# Patient Record
Sex: FEMALE | Race: WHITE | Employment: UNEMPLOYED | URBAN - METROPOLITAN AREA
[De-identification: names, ages, dates, MRNs, and addresses within clinical notes are randomized per-mention and may not be internally consistent; named-entity substitution may affect disease eponyms.]

---

## 2017-02-23 ENCOUNTER — HOSPITAL ENCOUNTER (EMERGENCY)
Age: 52
Discharge: HOME | End: 2017-02-24
Attending: EMERGENCY MEDICINE
Payer: SELF-PAY

## 2017-02-23 ENCOUNTER — APPOINTMENT (OUTPATIENT)
Dept: CT IMAGING | Age: 52
End: 2017-02-23
Attending: EMERGENCY MEDICINE
Payer: SELF-PAY

## 2017-02-23 DIAGNOSIS — R42 VERTIGO: Primary | ICD-10-CM

## 2017-02-23 LAB
ALBUMIN SERPL BCP-MCNC: 3.9 G/DL (ref 3.5–5)
ALBUMIN/GLOB SERPL: 1 {RATIO} (ref 1.1–2.2)
ALP SERPL-CCNC: 45 U/L (ref 45–117)
ALT SERPL-CCNC: 28 U/L (ref 12–78)
ANION GAP BLD CALC-SCNC: 8 MMOL/L (ref 5–15)
AST SERPL W P-5'-P-CCNC: 18 U/L (ref 15–37)
BASOPHILS # BLD AUTO: 0 K/UL (ref 0–0.1)
BASOPHILS # BLD: 0 % (ref 0–1)
BILIRUB SERPL-MCNC: 0.4 MG/DL (ref 0.2–1)
BUN SERPL-MCNC: 10 MG/DL (ref 6–20)
BUN/CREAT SERPL: 10 (ref 12–20)
CALCIUM SERPL-MCNC: 10.2 MG/DL (ref 8.5–10.1)
CHLORIDE SERPL-SCNC: 108 MMOL/L (ref 97–108)
CK SERPL-CCNC: 69 U/L (ref 26–192)
CO2 SERPL-SCNC: 26 MMOL/L (ref 21–32)
CREAT SERPL-MCNC: 1.02 MG/DL (ref 0.55–1.02)
EOSINOPHIL # BLD: 0.1 K/UL (ref 0–0.4)
EOSINOPHIL NFR BLD: 3 % (ref 0–7)
ERYTHROCYTE [DISTWIDTH] IN BLOOD BY AUTOMATED COUNT: 15.9 % (ref 11.5–14.5)
GLOBULIN SER CALC-MCNC: 3.8 G/DL (ref 2–4)
GLUCOSE SERPL-MCNC: 98 MG/DL (ref 65–100)
HCT VFR BLD AUTO: 33.3 % (ref 35–47)
HGB BLD-MCNC: 10.1 G/DL (ref 11.5–16)
LYMPHOCYTES # BLD AUTO: 30 % (ref 12–49)
LYMPHOCYTES # BLD: 1.5 K/UL (ref 0.8–3.5)
MCH RBC QN AUTO: 22.6 PG (ref 26–34)
MCHC RBC AUTO-ENTMCNC: 30.3 G/DL (ref 30–36.5)
MCV RBC AUTO: 74.7 FL (ref 80–99)
MONOCYTES # BLD: 0.3 K/UL (ref 0–1)
MONOCYTES NFR BLD AUTO: 6 % (ref 5–13)
NEUTS SEG # BLD: 3 K/UL (ref 1.8–8)
NEUTS SEG NFR BLD AUTO: 61 % (ref 32–75)
PLATELET # BLD AUTO: 209 K/UL (ref 150–400)
POTASSIUM SERPL-SCNC: 3.6 MMOL/L (ref 3.5–5.1)
PROT SERPL-MCNC: 7.7 G/DL (ref 6.4–8.2)
RBC # BLD AUTO: 4.46 M/UL (ref 3.8–5.2)
RBC MORPH BLD: ABNORMAL
RBC MORPH BLD: ABNORMAL
SODIUM SERPL-SCNC: 142 MMOL/L (ref 136–145)
TROPONIN I SERPL-MCNC: <0.04 NG/ML
WBC # BLD AUTO: 4.9 K/UL (ref 3.6–11)
WBC MORPH BLD: ABNORMAL

## 2017-02-23 PROCEDURE — 74011000250 HC RX REV CODE- 250: Performed by: EMERGENCY MEDICINE

## 2017-02-23 PROCEDURE — 74011250636 HC RX REV CODE- 250/636: Performed by: EMERGENCY MEDICINE

## 2017-02-23 PROCEDURE — 85025 COMPLETE CBC W/AUTO DIFF WBC: CPT | Performed by: EMERGENCY MEDICINE

## 2017-02-23 PROCEDURE — 36415 COLL VENOUS BLD VENIPUNCTURE: CPT | Performed by: EMERGENCY MEDICINE

## 2017-02-23 PROCEDURE — 96361 HYDRATE IV INFUSION ADD-ON: CPT

## 2017-02-23 PROCEDURE — 93005 ELECTROCARDIOGRAM TRACING: CPT

## 2017-02-23 PROCEDURE — 84484 ASSAY OF TROPONIN QUANT: CPT | Performed by: EMERGENCY MEDICINE

## 2017-02-23 PROCEDURE — 99284 EMERGENCY DEPT VISIT MOD MDM: CPT

## 2017-02-23 PROCEDURE — 96374 THER/PROPH/DIAG INJ IV PUSH: CPT

## 2017-02-23 PROCEDURE — 82550 ASSAY OF CK (CPK): CPT | Performed by: EMERGENCY MEDICINE

## 2017-02-23 PROCEDURE — 80053 COMPREHEN METABOLIC PANEL: CPT | Performed by: EMERGENCY MEDICINE

## 2017-02-23 PROCEDURE — 96375 TX/PRO/DX INJ NEW DRUG ADDON: CPT

## 2017-02-23 PROCEDURE — 70450 CT HEAD/BRAIN W/O DYE: CPT

## 2017-02-23 RX ORDER — PROCHLORPERAZINE MALEATE 10 MG
10 TABLET ORAL
Qty: 12 TAB | Refills: 0 | Status: SHIPPED | OUTPATIENT
Start: 2017-02-23 | End: 2017-03-02

## 2017-02-23 RX ORDER — KETOROLAC TROMETHAMINE 30 MG/ML
15 INJECTION, SOLUTION INTRAMUSCULAR; INTRAVENOUS
Status: COMPLETED | OUTPATIENT
Start: 2017-02-23 | End: 2017-02-23

## 2017-02-23 RX ORDER — DIPHENHYDRAMINE HYDROCHLORIDE 50 MG/ML
25 INJECTION, SOLUTION INTRAMUSCULAR; INTRAVENOUS
Status: DISCONTINUED | OUTPATIENT
Start: 2017-02-23 | End: 2017-02-24 | Stop reason: HOSPADM

## 2017-02-23 RX ADMIN — KETOROLAC TROMETHAMINE 15 MG: 30 INJECTION, SOLUTION INTRAMUSCULAR at 22:27

## 2017-02-23 RX ADMIN — SODIUM CHLORIDE 1000 ML: 900 INJECTION, SOLUTION INTRAVENOUS at 22:28

## 2017-02-23 RX ADMIN — SODIUM CHLORIDE 10 MG: 9 INJECTION INTRAMUSCULAR; INTRAVENOUS; SUBCUTANEOUS at 22:39

## 2017-02-24 VITALS
OXYGEN SATURATION: 100 % | HEIGHT: 68 IN | DIASTOLIC BLOOD PRESSURE: 95 MMHG | WEIGHT: 293 LBS | HEART RATE: 83 BPM | TEMPERATURE: 98.1 F | RESPIRATION RATE: 16 BRPM | SYSTOLIC BLOOD PRESSURE: 176 MMHG | BODY MASS INDEX: 44.41 KG/M2

## 2017-02-24 LAB
ATRIAL RATE: 312 BPM
CALCULATED P AXIS, ECG09: 39 DEGREES
CALCULATED R AXIS, ECG10: -14 DEGREES
CALCULATED T AXIS, ECG11: 24 DEGREES
DIAGNOSIS, 93000: NORMAL
Q-T INTERVAL, ECG07: 358 MS
QRS DURATION, ECG06: 98 MS
QTC CALCULATION (BEZET), ECG08: 470 MS
VENTRICULAR RATE, ECG03: 104 BPM

## 2017-02-24 NOTE — ED PROVIDER NOTES
HPI Comments: Violetta Proctor is a 50 y.o. female with PMHx of migraine, HTN, and thromboembolus, presenting ambulatory to ED c/o constant, worsening dizziness since 2016. Pt reports associated visual disturbance, noting everything looks distorted and her BL eyes \"feel funny\", mild confusion, and intermittent balance disturbance. She notes today's episode also included a mild HA, which resolved, nausea, and left hand tingling. Pt reports symptoms are unchanged with meclizine. She endorses evaluation yesterday by ENT and vertebral basilar migraine diagnosis, no vertigo. Pt reports history of migraines but notes current symptoms are different. She specifically denies vomiting, photophobia, weakness, and abdominal pain. PCP: None  Social Hx: former smoker (quit date: 1997); + EtOH (occasional); - drug use. There are no other complaints, changes, or physical findings at this time. Written by ESTEFANIA Smithibselena, as dictated by Maylin Isidro MD.          The history is provided by the patient. Past Medical History:   Diagnosis Date    GERD (gastroesophageal reflux disease)     Hypertension     Iron deficiency anemia, unspecified     Long term (current) use of anticoagulants     Hx of pulmonary embolism in 5 yrs ago and 2013.     Lymphedema     Migraine     Migraine     Other and unspecified symptoms and signs involving general sensations and perceptions     migraine    Other ill-defined conditions(799.89)     anemia    Other vitamin B12 deficiency anemia     Thromboembolus ([address]     deena PE x2       Past Surgical History:   Procedure Laterality Date    HX GYN      c sections x 3    HX GYN  3/17/14    HYSTEROSCOPY WITH THERMOCHOICE ENDOMETRIAL ABLATION    HX TUBAL LIGATION      with last          Family History:   Problem Relation Age of Onset    Heart Disease Mother     Cancer Father      stomach    Cancer Sister 39     colon cancer with mets to the liver.       Social History     Social History    Marital status:      Spouse name: N/A    Number of children: N/A    Years of education: N/A     Occupational History    Not on file. Social History Main Topics    Smoking status: Former Smoker     Quit date: 7/13/1997    Smokeless tobacco: Never Used    Alcohol use Yes      Comment: occasionally    Drug use: No    Sexual activity: Yes     Partners: Male     Other Topics Concern    Not on file     Social History Narrative         ALLERGIES: Latex; Oxycodone; and Aspirin    Review of Systems   Constitutional: Negative for chills, diaphoresis, fever and unexpected weight change. HENT: Negative for rhinorrhea and sore throat. Eyes: Positive for visual disturbance. Negative for photophobia and pain. Respiratory: Negative for shortness of breath, wheezing and stridor. Cardiovascular: Negative for chest pain and leg swelling. Gastrointestinal: Positive for nausea. Negative for abdominal pain, blood in stool, diarrhea and vomiting. Genitourinary: Negative for difficulty urinating, dysuria and flank pain. Musculoskeletal: Negative for back pain and neck stiffness. Skin: Negative for rash. Neurological: Positive for dizziness, numbness (left hand) and headaches. Negative for seizures, syncope, weakness and light-headedness.        + Off-balance;   Psychiatric/Behavioral: Positive for confusion. All other systems reviewed and are negative. Vitals:    02/23/17 1933 02/23/17 2137 02/23/17 2200 02/24/17 0004   BP: (!) 183/117 157/89 150/88 (!) 176/95   Pulse: (!) 103 88 84 83   Resp: 18 16     Temp: 98.2 °F (36.8 °C) 98.1 °F (36.7 °C)     SpO2: 100% 100% 100% 100%   Weight: 144.4 kg (318 lb 5.5 oz)      Height: 5' 8\" (1.727 m)               Physical Exam   Constitutional: She is oriented to person, place, and time. She appears well-developed and well-nourished. No distress.    + Elevated BMI;   HENT:   Nose: Nose normal. Mouth/Throat: Oropharynx is clear and moist. No oropharyngeal exudate. Eyes: Conjunctivae and EOM are normal. Pupils are equal, round, and reactive to light. Right eye exhibits no discharge. Left eye exhibits no discharge. No scleral icterus. No nystagmus; Neck: Normal range of motion. Neck supple. No JVD present. Cardiovascular: Normal rate, regular rhythm, normal heart sounds and intact distal pulses. No murmur heard. Pulmonary/Chest: Effort normal and breath sounds normal. No stridor. No respiratory distress. She has no wheezes. She has no rales. Abdominal: Soft. Bowel sounds are normal. She exhibits no distension. There is no tenderness. There is no rebound and no guarding. Musculoskeletal: She exhibits no edema or tenderness. Neurological: She is alert and oriented to person, place, and time. She has normal strength. She displays no tremor. No cranial nerve deficit or sensory deficit. She exhibits normal muscle tone. Coordination and gait normal.   Reflex Scores:       Brachioradialis reflexes are 2+ on the right side and 2+ on the left side. Patellar reflexes are 2+ on the right side and 2+ on the left side. No dysmetria; negative test of skew;   Skin: Skin is warm and dry. No rash noted. She is not diaphoretic. Psychiatric:   + Anxious, tearful;   Nursing note and vitals reviewed. MDM  Number of Diagnoses or Management Options  Vertigo:   Diagnosis management comments: Vertiginous symptoms x months, slightly worse today. Labs and head CT all unremarkable. Symptoms improved with treatment in the ED. Pt ambulating with normal gait. Do not feel pt's symptoms warrants emergent MRI. Will have her F/U closely with neurology.         Amount and/or Complexity of Data Reviewed  Clinical lab tests: ordered and reviewed  Tests in the radiology section of CPT®: ordered and reviewed  Review and summarize past medical records: yes  Independent visualization of images, tracings, or specimens: yes    Patient Progress  Patient progress: stable    Procedures    Progress Note:  11:54 PM  Pt re-evaluated. She reports her symptoms have improved. Pt ambulated with nursing with normal gait. Plan to discharge. Written by Cory Zaragoza ED Scribe, as dictated by Ferdinand Danielle MD.    LABORATORY TESTS:  Recent Results (from the past 12 hour(s))   CBC WITH AUTOMATED DIFF    Collection Time: 02/23/17  8:27 PM   Result Value Ref Range    WBC 4.9 3.6 - 11.0 K/uL    RBC 4.46 3.80 - 5.20 M/uL    HGB 10.1 (L) 11.5 - 16.0 g/dL    HCT 33.3 (L) 35.0 - 47.0 %    MCV 74.7 (L) 80.0 - 99.0 FL    MCH 22.6 (L) 26.0 - 34.0 PG    MCHC 30.3 30.0 - 36.5 g/dL    RDW 15.9 (H) 11.5 - 14.5 %    PLATELET 767 970 - 146 K/uL    NEUTROPHILS 61 32 - 75 %    LYMPHOCYTES 30 12 - 49 %    MONOCYTES 6 5 - 13 %    EOSINOPHILS 3 0 - 7 %    BASOPHILS 0 0 - 1 %    ABS. NEUTROPHILS 3.0 1.8 - 8.0 K/UL    ABS. LYMPHOCYTES 1.5 0.8 - 3.5 K/UL    ABS. MONOCYTES 0.3 0.0 - 1.0 K/UL    ABS. EOSINOPHILS 0.1 0.0 - 0.4 K/UL    ABS. BASOPHILS 0.0 0.0 - 0.1 K/UL    RBC COMMENTS MICROCYTOSIS  1+        RBC COMMENTS HYPOCHROMIA  1+        WBC COMMENTS REACTIVE LYMPHS     METABOLIC PANEL, COMPREHENSIVE    Collection Time: 02/23/17  8:27 PM   Result Value Ref Range    Sodium 142 136 - 145 mmol/L    Potassium 3.6 3.5 - 5.1 mmol/L    Chloride 108 97 - 108 mmol/L    CO2 26 21 - 32 mmol/L    Anion gap 8 5 - 15 mmol/L    Glucose 98 65 - 100 mg/dL    BUN 10 6 - 20 MG/DL    Creatinine 1.02 0.55 - 1.02 MG/DL    BUN/Creatinine ratio 10 (L) 12 - 20      GFR est AA >60 >60 ml/min/1.73m2    GFR est non-AA 57 (L) >60 ml/min/1.73m2    Calcium 10.2 (H) 8.5 - 10.1 MG/DL    Bilirubin, total 0.4 0.2 - 1.0 MG/DL    ALT (SGPT) 28 12 - 78 U/L    AST (SGOT) 18 15 - 37 U/L    Alk.  phosphatase 45 45 - 117 U/L    Protein, total 7.7 6.4 - 8.2 g/dL    Albumin 3.9 3.5 - 5.0 g/dL    Globulin 3.8 2.0 - 4.0 g/dL    A-G Ratio 1.0 (L) 1.1 - 2.2     TROPONIN I    Collection Time: 02/23/17  8:27 PM   Result Value Ref Range    Troponin-I, Qt. <0.04 <0.05 ng/mL   CK W/ REFLX CKMB    Collection Time: 02/23/17  8:27 PM   Result Value Ref Range    CK 69 26 - 192 U/L       IMAGING RESULTS:  EXAM: CT HEAD WITHOUT CONTRAST     INDICATION: Dizziness, nausea and numbness in the left hand.     COMPARISON: 9/2/2012.     CONTRAST: None.     TECHNIQUE: Unenhanced CT of the head was performed using 5 mm images. Brain and  bone windows were generated. Sagittal and coronal reformations were generated. CT dose reduction was achieved through use of a standardized protocol tailored  for this examination and automatic exposure control for dose modulation. CT dose  reduction was achieved through use of a standardized protocol tailored for this  examination and automatic exposure control for dose modulation.     FINDINGS:  The ventricles and sulci are normal in size, shape and configuration and  midline. There is no significant white matter disease. There is no  intracranial hemorrhage. There is no extra-axial collection, mass, mass effect  or midline shift. The basilar cisterns are open. No acute infarct is  identified. The bone windows demonstrate no abnormalities. The visualized  portions of the paranasal sinuses and mastoid air cells are clear.     IMPRESSION  IMPRESSION: Normal unenhanced CT examination of the head.             MEDICATIONS GIVEN:  Medications   prochlorperazine (COMPAZINE) with saline injection 10 mg (0 mg IntraVENous Held 2/23/17 2227)   diphenhydrAMINE (BENADRYL) injection 25 mg (25 mg IntraVENous Refused 2/23/17 2227)   sodium chloride 0.9 % bolus infusion 1,000 mL (0 mL IntraVENous IV Completed 2/23/17 2330)   ketorolac (TORADOL) injection 15 mg (15 mg IntraVENous Given 2/23/17 2227)   prochlorperazine (COMPAZINE) with saline injection 10 mg (10 mg IntraVENous Given 2/23/17 2239)       IMPRESSION:  1. Vertigo        PLAN:  1.    Discharge Medication List as of 2/23/2017 11:57 PM      START taking these medications    Details   prochlorperazine (COMPAZINE) 10 mg tablet Take 1 Tab by mouth every eight (8) hours as needed for Nausea., Normal, Disp-12 Tab, R-0         CONTINUE these medications which have NOT CHANGED    Details   traMADol (ULTRAM) 50 mg tablet Take 1 Tab by mouth every six (6) hours as needed for Pain. Max Daily Amount: 200 mg., Print, Disp-15 Tab, R-0      diazepam (VALIUM) 5 mg tablet Take 1 Tab by mouth every eight (8) hours as needed for Anxiety. Max Daily Amount: 15 mg., Print, Disp-20 Tab, R-0      cloNIDine HCl (CATAPRES) 0.1 mg tablet Take 1 Tab by mouth two (2) times a day., Print, Disp-30 Tab, R-0      oxyCODONE-acetaminophen (PERCOCET) 5-325 mg per tablet Take 1 Tab by mouth every four (4) hours as needed for Pain., Print, Disp-20 Tab, R-0      butalbital-acetaminophen-caffeine (ESGIC PLUS) -40 mg per tablet TAKE 1 TABLET BY MOUTH EVERY 4 HOURS AS NEEDED FOR PAIN, Print, Disp-100 Tab, R-2      bumetanide (BUMEX) 1 mg tablet TAKE 1 TABLET BY MOUTH TWICE DAILY, Normal**Patient requests 90 day supply**Disp-180 Tab, R-10      cyclobenzaprine (FLEXERIL) 10 mg tablet Take 1 Tab by mouth nightly., Normal, Disp-15 Tab, R-1      !! warfarin (COUMADIN) 5 mg tablet Take 20 mg by mouth daily. 3x a week, Historical Med      !! warfarin (COUMADIN) 10 mg tablet Take 15 mg by mouth daily. 4 times a week, Historical Med      OMEPRAZOLE (PRILOSEC PO) Take 20 mg by mouth daily. , Historical Med      ferrous sulfate 325 mg (65 mg iron) tablet Take 1 Tab by mouth two (2) times daily (with meals). Normal, 325 mg, Disp-60 Tab, R-6       !! - Potential duplicate medications found. Please discuss with provider.         2.   Follow-up Information     Follow up With Details Comments Contact Info    Primary Care Doctor Call in 2 days      Deja Mcghee MD Call in 1 day [facility] [address] [facility]  [phone]          Return to ED if worse     DISCHARGE NOTE  12:00 AM  The patient has been re-evaluated and is ready for discharge. Reviewed available results with patient. Counseled pt on diagnosis and care plan. Pt has expressed understanding, and all questions have been answered. Pt agrees with plan and agrees to F/U as recommended, or return to the ED if their sxs worsen. Discharge instructions have been provided and explained to the pt, along with reasons to return to the ED. Written by Yonny Freedman, ED Scribe, as dictated by Cuate Denton MD.        This note is prepared by Yonny Freedman, acting as Scribe for Cuate Denton MD.    Cuate Denton MD: The scribe's documentation has been prepared under my direction and personally reviewed by me in its entirety. I confirm that the note above accurately reflects all work, treatment, procedures, and medical decision making performed by me.

## 2017-02-24 NOTE — DISCHARGE INSTRUCTIONS
Dizziness: Care Instructions  Your Care Instructions  Dizziness is the feeling of unsteadiness or fuzziness in your head. It is different than having vertigo, which is a feeling that the room is spinning or that you are moving or falling. It is also different from lightheadedness, which is the feeling that you are about to faint. It can be hard to know what causes dizziness. Some people feel dizzy when they have migraine headaches. Sometimes bouts of flu can make you feel dizzy. Some medical conditions, such as heart problems or high blood pressure, can make you feel dizzy. Many medicines can cause dizziness, including medicines for high blood pressure, pain, or anxiety. If a medicine causes your symptoms, your doctor may recommend that you stop or change the medicine. If it is a problem with your heart, you may need medicine to help your heart work better. If there is no clear reason for your symptoms, your doctor may suggest watching and waiting for a while to see if the dizziness goes away on its own. Follow-up care is a key part of your treatment and safety. Be sure to make and go to all appointments, and call your doctor if you are having problems. It's also a good idea to know your test results and keep a list of the medicines you take. How can you care for yourself at home? · If your doctor recommends or prescribes medicine, take it exactly as directed. Call your doctor if you think you are having a problem with your medicine. · Do not drive while you feel dizzy. · Try to prevent falls. Steps you can take include:  ¨ Using nonskid mats, adding grab bars near the tub, and using night-lights. ¨ Clearing your home so that walkways are free of anything you might trip on. ¨ Letting family and friends know that you have been feeling dizzy. This will help them know how to help you. When should you call for help? Call 911 anytime you think you may need emergency care.  For example, call if:  · You passed out (lost consciousness). · You have dizziness along with symptoms of a heart attack. These may include:  ¨ Chest pain or pressure, or a strange feeling in the chest.  ¨ Sweating. ¨ Shortness of breath. ¨ Nausea or vomiting. ¨ Pain, pressure, or a strange feeling in the back, neck, jaw, or upper belly or in one or both shoulders or arms. ¨ Lightheadedness or sudden weakness. ¨ A fast or irregular heartbeat. · You have symptoms of a stroke. These may include:  ¨ Sudden numbness, tingling, weakness, or loss of movement in your face, arm, or leg, especially on only one side of your body. ¨ Sudden vision changes. ¨ Sudden trouble speaking. ¨ Sudden confusion or trouble understanding simple statements. ¨ Sudden problems with walking or balance. ¨ A sudden, severe headache that is different from past headaches. Call your doctor now or seek immediate medical care if:  · You feel dizzy and have a fever, headache, or ringing in your ears. · You have new or increased nausea and vomiting. · Your dizziness does not go away or comes back. Watch closely for changes in your health, and be sure to contact your doctor if:  · You do not get better as expected. Where can you learn more? Go to http://tg-jorge.info/. Enter P755 in the search box to learn more about \"Dizziness: Care Instructions. \"  Current as of: May 27, 2016  Content Version: 11.1  © 8649-1271 [employer]. Care instructions adapted under license by [employer] (which disclaims liability or warranty for this information). If you have questions about a medical condition or this instruction, always ask your healthcare professional. [place] [address] any warranty or liability for your use of this information.

## 2017-02-24 NOTE — ED NOTES
MD has provided verbal and written d/c instructions. All questions answered.  Pt ambulatory from ED in stable condition with papers in hand

## 2017-03-02 ENCOUNTER — OFFICE VISIT (OUTPATIENT)
Dept: NEUROLOGY | Age: 52
End: 2017-03-02

## 2017-03-02 VITALS
SYSTOLIC BLOOD PRESSURE: 158 MMHG | OXYGEN SATURATION: 96 % | WEIGHT: 293 LBS | HEIGHT: 68 IN | DIASTOLIC BLOOD PRESSURE: 86 MMHG | HEART RATE: 90 BPM | BODY MASS INDEX: 44.41 KG/M2

## 2017-03-02 DIAGNOSIS — G43.809 VESTIBULAR MIGRAINE: Primary | ICD-10-CM

## 2017-03-02 DIAGNOSIS — H47.10 PAPILLEDEMA: ICD-10-CM

## 2017-03-02 RX ORDER — MECLIZINE HYDROCHLORIDE 25 MG/1
TABLET ORAL
COMMUNITY

## 2017-03-02 RX ORDER — PROMETHAZINE HYDROCHLORIDE 25 MG/1
25 TABLET ORAL
COMMUNITY
End: 2020-07-07 | Stop reason: CLARIF

## 2017-03-02 RX ORDER — PAROXETINE 10 MG/1
10 TABLET, FILM COATED ORAL DAILY
Qty: 30 TAB | Refills: 5 | Status: SHIPPED | OUTPATIENT
Start: 2017-03-02 | End: 2017-08-06 | Stop reason: SDUPTHER

## 2017-03-02 NOTE — PROGRESS NOTES
NEUROLOGY NEW PATIENT CONSULTATION    REFERRED BY:  None    CHIEF COMPLAINT:  vertigo    HISTORY OF PRESENT ILLNESS    HISTORY PROVIDED BY:  Patient    Keyon Overton is a 50 y.o. female who I am asked to see in consultation for vertebrobasilar migraine. She has been having vertigo. She has had several CT scans. She is having some vision issues. She will have blurred and distorted vision. She says both eyes are weak but the left eye is worse. She will have dizziness triggered by anything. Reading makes her worse. She had ENT eval that was neg and referred to neuro. She will have head pain with them. She does have a history of migraine as well. She will get tightness in the neck and nausea/photo/phonophobia. She took fioricet as needed. She took it prior to migraine. She would take up to 100 per month. She will have head pain that is throbbing on one side of the head. She will take meclizine as needed. She did find a [employer] headache medication. She takes something almost everyday. She has not been on steroids previously. Currently she is on meclizine every 6 hrs. She says this is helping some. She is having issues if she doesn't take it. She also has tinnitus. PMH  Past Medical History:   Diagnosis Date    GERD (gastroesophageal reflux disease)     Hypertension     Iron deficiency anemia, unspecified     Long term (current) use of anticoagulants     Hx of pulmonary embolism in 5 yrs ago and November 2013.     Lymphedema     Migraine     Migraine     Other and unspecified symptoms and signs involving general sensations and perceptions     migraine    Other ill-defined conditions(505.23)     anemia    Other vitamin B12 deficiency anemia     Thromboembolus (HCC)     deena PE x2       SH  Social History     Social History    Marital status:      Spouse name: N/A    Number of children: N/A    Years of education: N/A     Social History Main Topics    Smoking status: Former Smoker     Quit date: 7/13/1997    Smokeless tobacco: Never Used    Alcohol use Yes      Comment: occasionally    Drug use: No    Sexual activity: Yes     Partners: Male     Other Topics Concern    None     Social History Narrative       FH  Family History   Problem Relation Age of Onset    Heart Disease Mother     Cancer Father      stomach    Cancer Sister 39     colon cancer with mets to the liver. ALLERGIES  Allergies   Allergen Reactions    Latex Hives    Oxycodone Nausea and Vomiting    Aspirin Rash     Reports this is no longer an allergy (10/20/2016)       CURRENT MEDS  Current Outpatient Prescriptions   Medication Sig Dispense Refill    meclizine (ANTIVERT) 25 mg tablet Take  by mouth three (3) times daily as needed.  promethazine (PHENERGAN) 25 mg tablet Take 25 mg by mouth every six (6) hours as needed for Nausea.  PARoxetine (PAXIL) 10 mg tablet Take 1 Tab by mouth daily. 30 Tab 5    diazepam (VALIUM) 5 mg tablet Take 1 Tab by mouth every eight (8) hours as needed for Anxiety. Max Daily Amount: 15 mg. 20 Tab 0    ferrous sulfate 325 mg (65 mg iron) tablet Take 1 Tab by mouth two (2) times daily (with meals).  60 Tab 6       REVIEW OF SYSTEMS:     Y  N       Y  N  Y  N   Y  N  [] [x] AIDS          [] [x] Falls  [] [x] Memory Loss  [] [x]  Shortness of breath  [x] [] Anxiety          [] [x] Fatigue [] [x] Muscle Pain        [] [x]  Skipped beats  [] [x] Chest Pain   [x] [x] Frequent HA [] [x] Ms Weakness     [] [x]  Snoring  [] [x] Constipation [] [x]Hearing loss [] [x] Nause/Vomiting  [] [x]  Stomach Pain  [] [x] Cough          [] [x]Hepatitis [] [x] Neuropathy         [] [x]  Swallowing difficulty  [] [x] Depression  [] [x]Incontinence [] [x] Poor appetite      [x] []  Vertigo  [] [x] Diarrhea       [] [x] Joint Pain [] [x] Rash                   [] [x]  Visual disturbances  [] [x] Fainting        [] [x] Leg Swelling [x] [] Ringing ears       [] [x]  Weight changes      []Unable to obtain  ROS due to  []mental status change  []sedated   []intubated          PREVIOUS WORKUP  IMAGING: CT head neg  (I personally reviewed these images in PACS and this is my impression)    LABS  Results for orders placed or performed during the hospital encounter of 02/23/17   CBC WITH AUTOMATED DIFF   Result Value Ref Range    WBC 4.9 3.6 - 11.0 K/uL    RBC 4.46 3.80 - 5.20 M/uL    HGB 10.1 (L) 11.5 - 16.0 g/dL    HCT 33.3 (L) 35.0 - 47.0 %    MCV 74.7 (L) 80.0 - 99.0 FL    MCH 22.6 (L) 26.0 - 34.0 PG    MCHC 30.3 30.0 - 36.5 g/dL    RDW 15.9 (H) 11.5 - 14.5 %    PLATELET 061 427 - 558 K/uL    NEUTROPHILS 61 32 - 75 %    LYMPHOCYTES 30 12 - 49 %    MONOCYTES 6 5 - 13 %    EOSINOPHILS 3 0 - 7 %    BASOPHILS 0 0 - 1 %    ABS. NEUTROPHILS 3.0 1.8 - 8.0 K/UL    ABS. LYMPHOCYTES 1.5 0.8 - 3.5 K/UL    ABS. MONOCYTES 0.3 0.0 - 1.0 K/UL    ABS. EOSINOPHILS 0.1 0.0 - 0.4 K/UL    ABS. BASOPHILS 0.0 0.0 - 0.1 K/UL    RBC COMMENTS MICROCYTOSIS  1+        RBC COMMENTS HYPOCHROMIA  1+        WBC COMMENTS REACTIVE LYMPHS     METABOLIC PANEL, COMPREHENSIVE   Result Value Ref Range    Sodium 142 136 - 145 mmol/L    Potassium 3.6 3.5 - 5.1 mmol/L    Chloride 108 97 - 108 mmol/L    CO2 26 21 - 32 mmol/L    Anion gap 8 5 - 15 mmol/L    Glucose 98 65 - 100 mg/dL    BUN 10 6 - 20 MG/DL    Creatinine 1.02 0.55 - 1.02 MG/DL    BUN/Creatinine ratio 10 (L) 12 - 20      GFR est AA >60 >60 ml/min/1.73m2    GFR est non-AA 57 (L) >60 ml/min/1.73m2    Calcium 10.2 (H) 8.5 - 10.1 MG/DL    Bilirubin, total 0.4 0.2 - 1.0 MG/DL    ALT (SGPT) 28 12 - 78 U/L    AST (SGOT) 18 15 - 37 U/L    Alk.  phosphatase 45 45 - 117 U/L    Protein, total 7.7 6.4 - 8.2 g/dL    Albumin 3.9 3.5 - 5.0 g/dL    Globulin 3.8 2.0 - 4.0 g/dL    A-G Ratio 1.0 (L) 1.1 - 2.2     TROPONIN I   Result Value Ref Range    Troponin-I, Qt. <0.04 <0.05 ng/mL   CK W/ REFLX CKMB   Result Value Ref Range    CK 69 26 - 192 U/L   EKG, 12 LEAD, INITIAL   Result Value Ref Range    Ventricular Rate 104 BPM    Atrial Rate 312 BPM    QRS Duration 98 ms    Q-T Interval 358 ms    QTC Calculation (Bezet) 470 ms    Calculated P Axis 39 degrees    Calculated R Axis -14 degrees    Calculated T Axis 24 degrees    Diagnosis       Sinus rhythm  Minimal voltage criteria for LVH, may be normal variant  Confirmed by Hammad Peters ([id number]) on 2/24/2017 12:35:12 PM         PHYSICAL EXAM  Visit Vitals    /86    Pulse 90    Ht 5' 8\" (1.727 m)    Wt 316 lb (143.3 kg)    SpO2 96%    BMI 48.05 kg/m2     General:  Alert, cooperative, no distress. Head:  Normocephalic, without obvious abnormality, atraumatic. Eyes:  Conjunctivae/corneas clear. Pupils equal, round, reactive to light. Extraocular movements intact, VFF, + papilledema   Lungs:  Heart:   Non labored breathing  Regular rate and rhythm, no carotid bruits   Abdomen:   Soft, non-distended   Extremities: Extremities normal, atraumatic, no cyanosis or edema. Pulses: 2+ and symmetric all extremities. Skin: Skin color, texture, turgor normal. No rashes or lesions.    Neurologic:  Gen: Attention normal             Language: naming, repetition, fluency normal             Memory: intact recent and remote memory  Cranial Nerves:  I: smell Not tested   II: visual fields Full to confrontation   II: pupils Equal, round, reactive to light   II: optic disc No papilledema   III,VII: ptosis none   III,IV,VI: extraocular muscles  Full ROM   V: mastication normal   V: facial light touch sensation  normal   VII: facial muscle function   symmetric   VIII: hearing symmetric   IX: soft palate elevation  normal   XI: trapezius strength  5/5   XI: sternocleidomastoid strength 5/5   XI: neck flexion strength  5/5   XII: tongue  midline     Motor: normal bulk and tone, no tremor              Strength: 5/5 all four extremities  Sensory: intact to LT, PP, vibration, and temperature  Coordination: FTN intact, Rhomberg negative  Gait: normal gait including tandem   Reflexes: 2+ throughout       IMPRESSION  Violetta Proctor is a 50 y.o. female who presents for evaluation of migraines. She has possible vestibular migraine. However on exam, I do see some concern for papilledema. For now, due to limited resources, will start her on Paxil for preventative. She will be seen by ophthalmology, and if papilledema is confirmed, will proceed with spinal tap. RECOMMENDATIONS  1. CTA head and neck when able  2. Abortive with OTC meds and meclizine as needed  3. Preventative with paxil 10mg daily and then titrate  4. Headache book given and discussed diet changes  5. Will try to take meclizine on a PRN basis  6. Will see ophtho and evaluate for papilledema. If she does have this will need to proceed with spinal tap. We will then need to figure out how to handle the cost of either Topamax or Diamox  7. Encouraged patient to apply for care card    FU TBD based on further testing    Rhoda Hong MD    CC: None  Fax: None    This note was created using voice recognition software. Despite editing, there may be syntax errors. This note will not be viewable in [address]

## 2017-03-02 NOTE — PATIENT INSTRUCTIONS
[facility]   Statement to Patients  April 1, 2014      In an effort to ensure the large volume of patient prescription refills is processed in the most efficient and expeditious manner, we are asking our patients to assist us by calling your Pharmacy for all prescription refills, this will include also your  Mail Order Pharmacy. The pharmacy will contact our office electronically to continue the refill process. Please do not wait until the last minute to call your pharmacy. We need at least 48 hours (2days) to fill prescriptions. We also encourage you to call your pharmacy before going to  your prescription to make sure it is ready. With regard to controlled substance prescription refill requests (narcotic refills) that need to be picked up at our office, we ask your cooperation by providing us with at least 72 hours (3days) notice that you will need a refill. We will not refill narcotic prescription refill requests after 4:00pm on any weekday, Monday through Thursday, or after 2:00pm on Fridays, or on the weekends. We encourage everyone to explore another way of getting your prescription refill request processed using [email or web address], our patient web portal through our electronic medical record system. [email or web address] is an efficient and effective way to communicate your medication request directly to the office and  downloadable as an karlie on your smart phone . [email or web address] also features a review functionality that allows you to view your medication list as well as leave messages for your physician. Are you ready to get connected? If so please review the attatched instructions or speak to any of our staff to get you set up right away! Thank you so much for your cooperation. Should you have any questions please contact our Practice Administrator.     The Physicians and Staff,  [facility]          A Healthy Lifestyle: Care Instructions  Your Care Instructions  A healthy lifestyle can help you feel good, stay at a healthy weight, and have plenty of energy for both work and play. A healthy lifestyle is something you can share with your whole family. A healthy lifestyle also can lower your risk for serious health problems, such as high blood pressure, heart disease, and diabetes. You can follow a few steps listed below to improve your health and the health of your family. Follow-up care is a key part of your treatment and safety. Be sure to make and go to all appointments, and call your doctor if you are having problems. Its also a good idea to know your test results and keep a list of the medicines you take. How can you care for yourself at home? · Do not eat too much sugar, fat, or fast foods. You can still have dessert and treats now and then. The goal is moderation. · Start small to improve your eating habits. Pay attention to portion sizes, drink less juice and soda pop, and eat more fruits and vegetables. ¨ Eat a healthy amount of food. A 3-ounce serving of meat, for example, is about the size of a deck of cards. Fill the rest of your plate with vegetables and whole grains. ¨ Limit the amount of soda and sports drinks you have every day. Drink more water when you are thirsty. ¨ Eat at least 5 servings of fruits and vegetables every day. It may seem like a lot, but it is not hard to reach this goal. A serving or helping is 1 piece of fruit, 1 cup of vegetables, or 2 cups of leafy, raw vegetables. Have an apple or some carrot sticks as an afternoon snack instead of a candy bar. Try to have fruits and/or vegetables at every meal.  · Make exercise part of your daily routine. You may want to start with simple activities, such as walking, bicycling, or slow swimming. Try to be active 30 to 60 minutes every day. You do not need to do all 30 to 60 minutes all at once. For example, you can exercise 3 times a day for 10 or 20 minutes.  Moderate exercise is safe for most people, but it is always a good idea to talk to your doctor before starting an exercise program.  · Keep moving. Power [place] the lawn, work in the garden, or [place]. Take the stairs instead of the elevator at work. · If you smoke, quit. People who smoke have an increased risk for heart attack, stroke, cancer, and other lung illnesses. Quitting is hard, but there are ways to boost your chance of quitting tobacco for good. ¨ Use nicotine gum, patches, or lozenges. ¨ Ask your doctor about stop-smoking programs and medicines. ¨ Keep trying. In addition to reducing your risk of diseases in the future, you will notice some benefits soon after you stop using tobacco. If you have shortness of breath or asthma symptoms, they will likely get better within a few weeks after you quit. · Limit how much alcohol you drink. Moderate amounts of alcohol (up to 2 drinks a day for men, 1 drink a day for women) are okay. But drinking too much can lead to liver problems, high blood pressure, and other health problems. Family health  If you have a family, there are many things you can do together to improve your health. · Eat meals together as a family as often as possible. · Eat healthy foods. This includes fruits, vegetables, lean meats and dairy, and whole grains. · Include your family in your fitness plan. Most people think of activities such as jogging or tennis as the way to fitness, but there are many ways you and your family can be more active. Anything that makes you breathe hard and gets your heart pumping is exercise. Here are some tips:  ¨ Walk to do errands or to take your child to school or the bus. ¨ Go for a family bike ride after dinner instead of watching TV. Where can you learn more? Go to http://tg-jorge.info/. Enter P035 in the search box to learn more about \"A Healthy Lifestyle: Care Instructions. \"  Current as of: July 26, 2016  Content Version: 11.1  © 8625-1373 [employer]. Care instructions adapted under license by [employer] (which disclaims liability or warranty for this information). If you have questions about a medical condition or this instruction, always ask your healthcare professional. [address] any warranty or liability for your use of this information. Patient Instructions/Plans:  Please have your eye doctor evaluate you for papilledema. Please call me back after he sees the eye doctor. At that point we will figure out what other testing we need to do and schedule your follow-up appointment.

## 2017-03-02 NOTE — MR AVS SNAPSHOT
Visit Information Date & Time Provider Department Dept. Phone Encounter #  
 3/2/2017  1:00 PM Ronald Berrios MD [facility]rology [facility] [phone] [id number] Upcoming Health Maintenance Date Due  
 PAP AKA CERVICAL CYTOLOGY 2/15/1988 DTaP/Tdap/Td series (1 - Tdap) 2/26/2012 INFLUENZA AGE 9 TO ADULT 8/1/2016 BREAST CANCER SCRN MAMMOGRAM 2/15/2017 FOBT Q 1 YEAR AGE 50-75 2/15/2017 Allergies as of 3/2/2017  Review Complete On: 2/23/2017 By: Ciera Patel RN Severity Noted Reaction Type Reactions Latex  02/25/2014    Hives Oxycodone Medium 08/17/2010   Intolerance Nausea and Vomiting Aspirin  07/05/2010    Rash Reports this is no longer an allergy (10/20/2016) Current Immunizations  Never Reviewed Name Date Influenza Vaccine PF 12/2/2013  5:19 PM  
 TD Vaccine 2/25/2012 11:54 AM  
  
 Not reviewed this visit You Were Diagnosed With   
  
 Codes Comments Vestibular migraine    -  Primary ICD-10-CM: G43.109 ICD-9-CM: 346.00 Vitals BP  
  
  
  
  
  
 158/86 Vitals History BMI and BSA Data Body Mass Index Body Surface Area 48.05 kg/m 2 2.62 m 2 Preferred Pharmacy Pharmacy Name Phone [facility] [address] [place] [address] [phone] Your Updated Medication List  
  
   
This list is accurate as of: 3/2/17  2:00 PM.  Always use your most recent med list.  
  
  
  
  
 diazePAM 5 mg tablet Commonly known as:  VALIUM Take 1 Tab by mouth every eight (8) hours as needed for Anxiety. Max Daily Amount: 15 mg.  
  
 ferrous sulfate 325 mg (65 mg iron) tablet Take 1 Tab by mouth two (2) times daily (with meals). meclizine 25 mg tablet Commonly known as:  ANTIVERT Take  by mouth three (3) times daily as needed. PARoxetine 10 mg tablet Commonly known as:  PAXIL Take 1 Tab by mouth daily. promethazine 25 mg tablet Commonly known as:  PHENERGAN Take 25 mg by mouth every six (6) hours as needed for Nausea. Prescriptions Sent to Pharmacy Refills PARoxetine (PAXIL) 10 mg tablet 5 Sig: Take 1 Tab by mouth daily. Class: Normal  
 Pharmacy: [address] #: [phone] Route: Oral  
  
Patient Instructions PRESCRIPTION REFILL POLICY [facility] Statement to Patients April 1, 2014 In an effort to ensure the large volume of patient prescription refills is processed in the most efficient and expeditious manner, we are asking our patients to assist us by calling your Pharmacy for all prescription refills, this will include also your  Mail Order Pharmacy. The pharmacy will contact our office electronically to continue the refill process. Please do not wait until the last minute to call your pharmacy. We need at least 48 hours (2days) to fill prescriptions. We also encourage you to call your pharmacy before going to  your prescription to make sure it is ready. With regard to controlled substance prescription refill requests (narcotic refills) that need to be picked up at our office, we ask your cooperation by providing us with at least 72 hours (3days) notice that you will need a refill. We will not refill narcotic prescription refill requests after 4:00pm on any weekday, Monday through Thursday, or after 2:00pm on Fridays, or on the weekends. We encourage everyone to explore another way of getting your prescription refill request processed using ShopTap, our patient web portal through our electronic medical record system. ShopTap is an efficient and effective way to communicate your medication request directly to the office and  downloadable as an karlie on your smart phone .  ShopTap also features a review functionality that allows you to view your medication list as well as leave messages for your physician. Are you ready to get connected? If so please review the attatched instructions or speak to any of our staff to get you set up right away! Thank you so much for your cooperation. Should you have any questions please contact our Practice Administrator. The Physicians and Staff,  [facility] A Healthy Lifestyle: Care Instructions Your Care Instructions A healthy lifestyle can help you feel good, stay at a healthy weight, and have plenty of energy for both work and play. A healthy lifestyle is something you can share with your whole family. A healthy lifestyle also can lower your risk for serious health problems, such as high blood pressure, heart disease, and diabetes. You can follow a few steps listed below to improve your health and the health of your family. Follow-up care is a key part of your treatment and safety. Be sure to make and go to all appointments, and call your doctor if you are having problems. Its also a good idea to know your test results and keep a list of the medicines you take. How can you care for yourself at home? · Do not eat too much sugar, fat, or fast foods. You can still have dessert and treats now and then. The goal is moderation. · Start small to improve your eating habits. Pay attention to portion sizes, drink less juice and soda pop, and eat more fruits and vegetables. ¨ Eat a healthy amount of food. A 3-ounce serving of meat, for example, is about the size of a deck of cards. Fill the rest of your plate with vegetables and whole grains. ¨ Limit the amount of soda and sports drinks you have every day. Drink more water when you are thirsty. ¨ Eat at least 5 servings of fruits and vegetables every day. It may seem like a lot, but it is not hard to reach this goal. A serving or helping is 1 piece of fruit, 1 cup of vegetables, or 2 cups of leafy, raw vegetables. Have an apple or some carrot sticks as an afternoon snack instead of a candy bar. Try to have fruits and/or vegetables at every meal. 
· Make exercise part of your daily routine. You may want to start with simple activities, such as walking, bicycling, or slow swimming. Try to be active 30 to 60 minutes every day. You do not need to do all 30 to 60 minutes all at once. For example, you can exercise 3 times a day for 10 or 20 minutes. Moderate exercise is safe for most people, but it is always a good idea to talk to your doctor before starting an exercise program. 
· Keep moving. Tegan  the lawn, work in the garden, or PlayRaven. Take the stairs instead of the elevator at work. · If you smoke, quit. People who smoke have an increased risk for heart attack, stroke, cancer, and other lung illnesses. Quitting is hard, but there are ways to boost your chance of quitting tobacco for good. ¨ Use nicotine gum, patches, or lozenges. ¨ Ask your doctor about stop-smoking programs and medicines. ¨ Keep trying. In addition to reducing your risk of diseases in the future, you will notice some benefits soon after you stop using tobacco. If you have shortness of breath or asthma symptoms, they will likely get better within a few weeks after you quit. · Limit how much alcohol you drink. Moderate amounts of alcohol (up to 2 drinks a day for men, 1 drink a day for women) are okay. But drinking too much can lead to liver problems, high blood pressure, and other health problems. Family health If you have a family, there are many things you can do together to improve your health. · Eat meals together as a family as often as possible. · Eat healthy foods. This includes fruits, vegetables, lean meats and dairy, and whole grains. · Include your family in your fitness plan.  Most people think of activities such as jogging or tennis as the way to fitness, but there are many ways you and your family can be more active. Anything that makes you breathe hard and gets your heart pumping is exercise. Here are some tips: 
¨ Walk to do errands or to take your child to school or the bus. ¨ Go for a family bike ride after dinner instead of watching TV. Where can you learn more? Go to http://tg-jorge.info/. Enter M700 in the search box to learn more about \"A Healthy Lifestyle: Care Instructions. \" Current as of: July 26, 2016 Content Version: 11.1 © 2764-6306 [employer]. Care instructions adapted under license by [employer] (which disclaims liability or warranty for this information). If you have questions about a medical condition or this instruction, always ask your healthcare professional. [address] any warranty or liability for your use of this information. Patient Instructions/Plans: 
Please have your eye doctor evaluate you for papilledema. Please call me back after he sees the eye doctor. At that point we will figure out what other testing we need to do and schedule your follow-up appointment. Introducing [facility]! Dear Anjum Segundo: Thank you for requesting a Redu.us account. Our records indicate that you already have an active Redu.us account. You can access your account anytime at [email or web address] Did you know that you can access your hospital and ER discharge instructions at any time in Redu.us? You can also review all of your test results from your hospital stay or ER visit. Additional Information If you have questions, please visit the Frequently Asked Questions section of the Redu.us website at [email or web address] Remember, Redu.us is NOT to be used for urgent needs. For medical emergencies, dial 911. Now available from your iPhone and Android! Please provide this summary of care documentation to your next provider. Your primary care clinician is listed as NONE. If you have any questions after today's visit, please call [phone].

## 2017-08-06 DIAGNOSIS — G43.809 VESTIBULAR MIGRAINE: ICD-10-CM

## 2017-08-06 RX ORDER — PAROXETINE 10 MG/1
TABLET, FILM COATED ORAL
Qty: 30 TAB | Refills: 5 | Status: SHIPPED | OUTPATIENT
Start: 2017-08-06 | End: 2018-09-06 | Stop reason: CLARIF

## 2017-09-18 ENCOUNTER — OFFICE VISIT (OUTPATIENT)
Dept: NEUROLOGY | Age: 52
End: 2017-09-18

## 2017-09-18 VITALS
SYSTOLIC BLOOD PRESSURE: 160 MMHG | HEIGHT: 68 IN | DIASTOLIC BLOOD PRESSURE: 80 MMHG | OXYGEN SATURATION: 98 % | BODY MASS INDEX: 44.41 KG/M2 | WEIGHT: 293 LBS | HEART RATE: 100 BPM

## 2017-09-18 DIAGNOSIS — G43.909 MIGRAINE WITHOUT STATUS MIGRAINOSUS, NOT INTRACTABLE, UNSPECIFIED MIGRAINE TYPE: Primary | ICD-10-CM

## 2017-09-18 RX ORDER — PAROXETINE HYDROCHLORIDE 20 MG/1
20 TABLET, FILM COATED ORAL DAILY
Qty: 30 TAB | Refills: 5 | Status: SHIPPED | OUTPATIENT
Start: 2017-09-18 | End: 2018-04-30 | Stop reason: SDUPTHER

## 2017-09-18 NOTE — MR AVS SNAPSHOT
Visit Information Date & Time Provider Department Dept. Phone Encounter #  
 9/18/2017  9:40 AM Reji Luz MD [facility]rology [facility] at [facility] [phone] [id number] Your Appointments 3/12/2018  9:40 AM  
Follow Up with Reji Luz MD  
Ne[facility] [address]) Appt Note: follow up dizziness $ 0 CP jll 9/18/17  
 [address], 
[address], [address] [id number]  
[address], [address], [address] [address] Upcoming Health Maintenance Date Due  
 PAP AKA CERVICAL CYTOLOGY 2/15/1988 DTaP/Tdap/Td series (1 - Tdap) 2/26/2012 BREAST CANCER SCRN MAMMOGRAM 2/15/2017 FOBT Q 1 YEAR AGE 50-75 2/15/2017 INFLUENZA AGE 9 TO ADULT 8/1/2017 Allergies as of 9/18/2017  Review Complete On: 9/18/2017 By: Reji Luz MD  
  
 Severity Noted Reaction Type Reactions Latex  02/25/2014    Hives Oxycodone Medium 08/17/2010   Intolerance Nausea and Vomiting Aspirin  07/05/2010    Rash Reports this is no longer an allergy (10/20/2016) Current Immunizations  Never Reviewed Name Date Influenza Vaccine PF 12/2/2013  5:19 PM  
 TD Vaccine 2/25/2012 11:54 AM  
  
 Not reviewed this visit Vitals BP Pulse Height(growth percentile) Weight(growth percentile) SpO2 BMI  
 160/80 100 5' 8\" (1.727 m) 338 lb (153.3 kg) 98% 51.39 kg/m2 OB Status Smoking Status Having regular periods Former Smoker Vitals History BMI and BSA Data Body Mass Index Body Surface Area  
 51.39 kg/m 2 2.71 m 2 Preferred Pharmacy Pharmacy Name Phone [facility] [address] [place] [address] [phone] Your Updated Medication List  
  
   
This list is accurate as of: 9/18/17  9:41 AM.  Always use your most recent med list.  
  
  
  
  
 diazePAM 5 mg tablet Commonly known as:  VALIUM Take 1 Tab by mouth every eight (8) hours as needed for Anxiety. Max Daily Amount: 15 mg.  
  
 ferrous sulfate 325 mg (65 mg iron) tablet Take 1 Tab by mouth two (2) times daily (with meals). meclizine 25 mg tablet Commonly known as:  ANTIVERT Take  by mouth three (3) times daily as needed. * PARoxetine 10 mg tablet Commonly known as:  PAXIL TAKE ONE TABLET BY MOUTH ONCE DAILY * PARoxetine 20 mg tablet Commonly known as:  PAXIL Take 1 Tab by mouth daily. Indications: ANXIETY WITH DEPRESSION  
  
 promethazine 25 mg tablet Commonly known as:  PHENERGAN Take 25 mg by mouth every six (6) hours as needed for Nausea. * Notice: This list has 2 medication(s) that are the same as other medications prescribed for you. Read the directions carefully, and ask your doctor or other care provider to review them with you. Prescriptions Sent to Pharmacy Refills PARoxetine (PAXIL) 20 mg tablet 5 Sig: Take 1 Tab by mouth daily. Indications: ANXIETY WITH DEPRESSION Class: Normal  
 Pharmacy: [address] #: [phone] Route: Oral  
  
Patient Instructions PRESCRIPTION REFILL POLICY [facility] Statement to Patients April 1, 2014 In an effort to ensure the large volume of patient prescription refills is processed in the most efficient and expeditious manner, we are asking our patients to assist us by calling your Pharmacy for all prescription refills, this will include also your  Mail Order Pharmacy. The pharmacy will contact our office electronically to continue the refill process. Please do not wait until the last minute to call your pharmacy. We need at least 48 hours (2days) to fill prescriptions. We also encourage you to call your pharmacy before going to  your prescription to make sure it is ready. With regard to controlled substance prescription refill requests (narcotic refills) that need to be picked up at our office, we ask your cooperation by providing us with at least 72 hours (3days) notice that you will need a refill. We will not refill narcotic prescription refill requests after 4:00pm on any weekday, Monday through Thursday, or after 2:00pm on Fridays, or on the weekends. We encourage everyone to explore another way of getting your prescription refill request processed using eDossea, our patient web portal through our electronic medical record system. eDossea is an efficient and effective way to communicate your medication request directly to the office and  downloadable as an karlie on your smart phone . eDossea also features a review functionality that allows you to view your medication list as well as leave messages for your physician. Are you ready to get connected? If so please review the attatched instructions or speak to any of our staff to get you set up right away! Thank you so much for your cooperation. Should you have any questions please contact our Practice Administrator. The Physicians and Staff,  [facility] Introducing [facility]! Dear Arelis Mendez: Thank you for requesting a eDossea account. Our records indicate that you already have an active eDossea account. You can access your account anytime at [email or web address] Did you know that you can access your hospital and ER discharge instructions at any time in eDossea? You can also review all of your test results from your hospital stay or ER visit. Additional Information If you have questions, please visit the Frequently Asked Questions section of the eDossea website at [email or web address] Remember, eDossea is NOT to be used for urgent needs. For medical emergencies, dial 911. Now available from your iPhone and Android! Please provide this summary of care documentation to your next provider. Your primary care clinician is listed as NONE. If you have any questions after today's visit, please call [phone].

## 2017-09-18 NOTE — PATIENT INSTRUCTIONS
[facility]   Statement to Patients  April 1, 2014      In an effort to ensure the large volume of patient prescription refills is processed in the most efficient and expeditious manner, we are asking our patients to assist us by calling your Pharmacy for all prescription refills, this will include also your  Mail Order Pharmacy. The pharmacy will contact our office electronically to continue the refill process. Please do not wait until the last minute to call your pharmacy. We need at least 48 hours (2days) to fill prescriptions. We also encourage you to call your pharmacy before going to  your prescription to make sure it is ready. With regard to controlled substance prescription refill requests (narcotic refills) that need to be picked up at our office, we ask your cooperation by providing us with at least 72 hours (3days) notice that you will need a refill. We will not refill narcotic prescription refill requests after 4:00pm on any weekday, Monday through Thursday, or after 2:00pm on Fridays, or on the weekends. We encourage everyone to explore another way of getting your prescription refill request processed using Open Me, our patient web portal through our electronic medical record system. Open Me is an efficient and effective way to communicate your medication request directly to the office and  downloadable as an karlie on your smart phone . Open Me also features a review functionality that allows you to view your medication list as well as leave messages for your physician. Are you ready to get connected? If so please review the attatched instructions or speak to any of our staff to get you set up right away! Thank you so much for your cooperation. Should you have any questions please contact our Practice Administrator.     The Physicians and Staff,  [address] [place] [address] Neurology Clinic

## 2017-09-18 NOTE — PROGRESS NOTES
HISTORY OF PRESENT ILLNESS  Lennart Hatchet is a 50 y.o. female. HPI Comments: Please Carlos Dixon is a 57-year-old female who comes today for headaches. She has multiple headache days per month. She will get photophobia and some nausea with these. She also has a complaint of vertigo. She is morbidly obese. She is currently taking Paxil 10 mg a day for migraine, meclizine and Phenergan for dizziness and migraine associated nausea. She states that she still has multiple headache days per month per she is taking over-the-counter medication that has aspirin Tylenol and caffeine in it . She states she gets about 5-6 severe headache days per month per the over-the-counter medication helps but it does not make them go away. She is on a diet and trying to lose weight. She has no medical insurance. She has had a CT scan of her head without contrast which was normal.  She has a history of hypertension and lymphedema, thrombocytopenia and GERD. Dizziness    The history is provided by the patient. This is a chronic problem. Associated symptoms include dizziness. Current Outpatient Prescriptions on File Prior to Visit   Medication Sig Dispense Refill    PARoxetine (PAXIL) 10 mg tablet TAKE ONE TABLET BY MOUTH ONCE DAILY 30 Tab 5    meclizine (ANTIVERT) 25 mg tablet Take  by mouth three (3) times daily as needed.  promethazine (PHENERGAN) 25 mg tablet Take 25 mg by mouth every six (6) hours as needed for Nausea.  diazepam (VALIUM) 5 mg tablet Take 1 Tab by mouth every eight (8) hours as needed for Anxiety. Max Daily Amount: 15 mg. 20 Tab 0    ferrous sulfate 325 mg (65 mg iron) tablet Take 1 Tab by mouth two (2) times daily (with meals). 60 Tab 6     No current facility-administered medications on file prior to visit.       Past Medical History:   Diagnosis Date    GERD (gastroesophageal reflux disease)     Hypertension     Iron deficiency anemia, unspecified     Long term (current) use of anticoagulants Hx of pulmonary embolism in 5 yrs ago and November 2013.  Lymphedema     Migraine     Migraine     Other and unspecified symptoms and signs involving general sensations and perceptions     migraine    Other ill-defined conditions     anemia    Other vitamin B12 deficiency anemia     Thromboembolus ([address]     deena PE x2         Review of Systems   Constitutional:        Review of systems positive for anxiety, fatigue, memory loss   Neurological: Positive for dizziness. Physical Exam   Constitutional: She is oriented to person, place, and time. She appears well-developed. No distress. HENT:   Head: Normocephalic and atraumatic. Eyes: Conjunctivae and EOM are normal. Pupils are equal, round, and reactive to light. No scleral icterus. Neck: Normal range of motion. Neck supple. No thyromegaly present. Cardiovascular: Normal rate and normal heart sounds. No murmur heard. Lymphadenopathy:     She has no cervical adenopathy. Neurological: She is alert and oriented to person, place, and time. A cranial nerve deficit is present. Coordination normal.   Funduscopic exam revealed flat discs and retinal vasculature normal   Skin: Skin is warm and dry. No rash noted. She is not diaphoretic. No erythema. Psychiatric: She has a normal mood and affect. Her behavior is normal. Thought content normal.       ASSESSMENT and PLAN  MIGRAINE  I will increase this patient's Paxil from 10 mg a day to 20 mrem a day as she feels it helped. I explained her that the price will not double. She will continue to take her over-the-counter medication. I advised to go on the Internet and check to see if she could get the large bottle off of [address] for a much cheaper per pill price. She is working on getting her [facility] care card. I will see her back in 6 months      This note will not be viewable in [address]

## 2018-05-01 RX ORDER — PAROXETINE HYDROCHLORIDE 20 MG/1
TABLET, FILM COATED ORAL
Qty: 30 TAB | Refills: 5 | Status: SHIPPED | OUTPATIENT
Start: 2018-05-01 | End: 2018-09-06 | Stop reason: ALTCHOICE

## 2018-09-06 ENCOUNTER — OFFICE VISIT (OUTPATIENT)
Dept: NEUROLOGY | Age: 53
End: 2018-09-06

## 2018-09-06 VITALS
WEIGHT: 293 LBS | DIASTOLIC BLOOD PRESSURE: 80 MMHG | OXYGEN SATURATION: 98 % | BODY MASS INDEX: 44.41 KG/M2 | SYSTOLIC BLOOD PRESSURE: 120 MMHG | HEART RATE: 105 BPM | HEIGHT: 68 IN

## 2018-09-06 DIAGNOSIS — G43.909 MIGRAINE WITHOUT STATUS MIGRAINOSUS, NOT INTRACTABLE, UNSPECIFIED MIGRAINE TYPE: Primary | ICD-10-CM

## 2018-09-06 DIAGNOSIS — F33.9 RECURRENT MAJOR DEPRESSIVE DISORDER, REMISSION STATUS UNSPECIFIED (HCC): ICD-10-CM

## 2018-09-06 DIAGNOSIS — E66.09 OBESITY DUE TO EXCESS CALORIES WITHOUT SERIOUS COMORBIDITY, UNSPECIFIED CLASSIFICATION: ICD-10-CM

## 2018-09-06 RX ORDER — BUTALBITAL, ACETAMINOPHEN AND CAFFEINE 50; 325; 40 MG/1; MG/1; MG/1
TABLET ORAL
Qty: 30 TAB | Refills: 5 | Status: SHIPPED | OUTPATIENT
Start: 2018-09-06 | End: 2019-11-05 | Stop reason: SDUPTHER

## 2018-09-06 RX ORDER — BUTALBITAL, ACETAMINOPHEN AND CAFFEINE 50; 325; 40 MG/1; MG/1; MG/1
TABLET ORAL
Qty: 30 TAB | Refills: 5 | Status: SHIPPED | OUTPATIENT
Start: 2018-09-06 | End: 2018-09-06 | Stop reason: SDUPTHER

## 2018-09-06 RX ORDER — PAROXETINE HYDROCHLORIDE 20 MG/1
20 TABLET, FILM COATED ORAL DAILY
Qty: 30 TAB | Refills: 5 | Status: SHIPPED | OUTPATIENT
Start: 2018-09-06 | End: 2019-11-05 | Stop reason: SDUPTHER

## 2018-09-06 RX ORDER — PAROXETINE HYDROCHLORIDE 20 MG/1
TABLET, FILM COATED ORAL
Qty: 30 TAB | Refills: 5 | Status: SHIPPED | OUTPATIENT
Start: 2018-09-06 | End: 2019-10-08 | Stop reason: SDUPTHER

## 2018-09-06 RX ORDER — AMITRIPTYLINE HYDROCHLORIDE 25 MG/1
TABLET, FILM COATED ORAL
Qty: 30 TAB | Refills: 5 | Status: SHIPPED | OUTPATIENT
Start: 2018-09-06 | End: 2018-09-06 | Stop reason: SDUPTHER

## 2018-09-06 RX ORDER — AMITRIPTYLINE HYDROCHLORIDE 25 MG/1
TABLET, FILM COATED ORAL
Qty: 30 TAB | Refills: 5 | Status: SHIPPED | OUTPATIENT
Start: 2018-09-06 | End: 2018-11-28 | Stop reason: SDUPTHER

## 2018-09-06 NOTE — MR AVS SNAPSHOT
[address], 
[id number], [address] [facility] 
[phone] Patient: Jluis Love MRN:  [id number] Visit Information Date & Time Provider Department Dept. Phone Encounter #  
 9/6/2018  3:40 PM Yulisa Patel MD [facility] at [facility] [phone] [id number] Follow-up Instructions Return in about 6 months (around 3/6/2019). Your Appointments 3/7/2019  3:00 PM  
Follow Up with Yulisa Patel MD  
Neurology Clinic at [facility] Appt Note: follow up  migraines $ 0 Cp jll 9/6/18  
 [address] [place] [address], 
[address] [place] [address], [address] [phone]  
[address] [facility], [address] [place] [address], [address] [phone] Upcoming Health Maintenance Date Due  
 PAP AKA CERVICAL CYTOLOGY 2/15/1988 DTaP/Tdap/Td series (1 - Tdap) 2/26/2012 BREAST CANCER SCRN MAMMOGRAM 2/15/2017 FOBT Q 1 YEAR AGE 50-75 2/15/2017 Influenza Age 5 to Adult 8/1/2018 Allergies as of 9/6/2018  Review Complete On: 9/6/2018 By: Victor Hugo Lobato LPN Severity Noted Reaction Type Reactions Latex  02/25/2014    Hives Oxycodone Medium 08/17/2010   Intolerance Nausea and Vomiting Aspirin  07/05/2010    Rash Reports this is no longer an allergy (10/20/2016) Current Immunizations  Never Reviewed Name Date Influenza Vaccine PF 12/2/2013  5:19 PM  
 TD Vaccine 2/25/2012 11:54 AM  
  
 Not reviewed this visit You Were Diagnosed With   
  
 Codes Comments Migraine without status migrainosus, not intractable, unspecified migraine type    -  Primary ICD-10-CM: A76.799 ICD-9-CM: 346.90 Vitals BP Pulse Height(growth percentile) Weight(growth percentile) SpO2 BMI  
 120/80 (!) 105 5' 8\" (1.727 m) 325 lb (147.4 kg) 98% 49.42 kg/m2 OB Status Smoking Status Having regular periods Former Smoker Vitals History BMI and BSA Data Body Mass Index Body Surface Area  
 49.42 kg/m 2 2.66 m 2 Preferred Pharmacy Pharmacy Name Phone [address] [place] [address] [phone] Your Updated Medication List  
  
   
This list is accurate as of 9/6/18  4:04 PM.  Always use your most recent med list.  
  
  
  
  
 amitriptyline 25 mg tablet Commonly known as:  ELAVIL One half p.o. nightly for 2 weeks and then 1 p.o. nightly  Indications: MIGRAINE PREVENTION  
  
 butalbital-acetaminophen-caffeine -40 mg per tablet Commonly known as:  Shahriar Gift One half or 1 p.o. twice daily as needed headache, may refill monthly  Indications: Migraine  
  
 diazePAM 5 mg tablet Commonly known as:  VALIUM Take 1 Tab by mouth every eight (8) hours as needed for Anxiety. Max Daily Amount: 15 mg.  
  
 ferrous sulfate 325 mg (65 mg iron) tablet Take 1 Tab by mouth two (2) times daily (with meals). meclizine 25 mg tablet Commonly known as:  ANTIVERT Take  by mouth three (3) times daily as needed. * PARoxetine 20 mg tablet Commonly known as:  PAXIL Take 1 Tab by mouth daily. Indications: ANXIETY WITH DEPRESSION  
  
 * PARoxetine 20 mg tablet Commonly known as:  PAXIL TAKE ONE TABLET BY MOUTH ONCE DAILY FOR ANXIETY WITH DEPRESSION  
  
 promethazine 25 mg tablet Commonly known as:  PHENERGAN Take 25 mg by mouth every six (6) hours as needed for Nausea. * Notice: This list has 2 medication(s) that are the same as other medications prescribed for you. Read the directions carefully, and ask your doctor or other care provider to review them with you. Prescriptions Printed Refills PARoxetine (PAXIL) 20 mg tablet 5 Sig: TAKE ONE TABLET BY MOUTH ONCE DAILY FOR ANXIETY WITH DEPRESSION  Class: Print  
 amitriptyline (ELAVIL) 25 mg tablet 5  
 Sig: One half p.o. nightly for 2 weeks and then 1 p.o. nightly  Indications: MIGRAINE PREVENTION Class: Print  
 butalbital-acetaminophen-caffeine (FIORICET, ESGIC) -40 mg per tablet 5 Sig: One half or 1 p.o. twice daily as needed headache, may refill monthly  Indications: Migraine Class: Print Prescriptions Sent to Pharmacy Refills PARoxetine (PAXIL) 20 mg tablet 5 Sig: Take 1 Tab by mouth daily. Indications: ANXIETY WITH DEPRESSION Class: Normal  
 Pharmacy: [address] #: [phone] Route: Oral  
  
Follow-up Instructions Return in about 6 months (around 3/6/2019). Patient Instructions Office Policies 
o Phone calls/patient messages: Please allow up to 24 hours for someone in the office to contact you about your call or message. Be mindful your provider may be out of the office or your message may require further review. We encourage you to use [employer] for your messages as this is a faster, more efficient way to communicate with our office 
o Medication Refills: 
Prescription medications require up to 48 business hours to process. We encourage you to use [employer] for your refills. For controlled medications: Please allow up to 72 business hours to process. Certain medications may require you to  a written prescription at our office. NO narcotic/controlled medications will be prescribed after 4pm Monday through Friday or on weekends 
o Form/Paperwork Completion: 
Please note there is a $25 fee for all paperwork completed by our providers. We ask that you allow 7-14 business days. Pre-payment is due prior to picking up/faxing the completed form. You may also download your forms to [employer] to have your doctor print off. 
o Test Results: In order for a patient to obtain test results, an appointment must be made with the physician to review the results. Test results cannot be discussed over the phone. Introducing [facility] HE[facility]! Dear Marino Michaels: Thank you for requesting a [employer] account. Our records indicate that you already have an active [employer] account. You can access your account anytime at [email or web address] Did you know that you can access your hospital and ER discharge instructions at any time in [employer]? You can also review all of your test results from your hospital stay or ER visit. Additional Information If you have questions, please visit the Frequently Asked Questions section of the [employer] website at [email or web address] Remember, [employer] is NOT to be used for urgent needs. For medical emergencies, dial 911. Now available from your iPhone and Android! Please provide this summary of care documentation to your next provider. Your primary care clinician is listed as NONE. If you have any questions after today's visit, please call [phone].

## 2018-09-06 NOTE — PROGRESS NOTES
HISTORY OF PRESENT ILLNESS Walter Toledo is a 48 y.o. female. HPI Comments: Please Herber Santos is a 82-year-old female who comes today for headaches. She has multiple headache days per month. She will get photophobia and some nausea with these. She also has a complaint of vertigo. She is morbidly obese. She is currently taking Paxil 20 mg a day for migraine, she had a CT Head with contrast which was normal. 
 
She returns today, she still has chronic daily headache although some days are clearly worse than others. She feels that the Paxil which was started by another physician has been helpful with her vertigo but not much with her headaches. She has very little in the way of money and really cannot afford much in the way of medication. She has a history of hypertension and lymphedema, thrombocytopenia and GERD. Dizziness The history is provided by the patient. This is a chronic problem. Associated symptoms include dizziness. Current Outpatient Prescriptions on File Prior to Visit Medication Sig Dispense Refill  meclizine (ANTIVERT) 25 mg tablet Take  by mouth three (3) times daily as needed.  promethazine (PHENERGAN) 25 mg tablet Take 25 mg by mouth every six (6) hours as needed for Nausea.  diazepam (VALIUM) 5 mg tablet Take 1 Tab by mouth every eight (8) hours as needed for Anxiety. Max Daily Amount: 15 mg. 20 Tab 0  
 ferrous sulfate 325 mg (65 mg iron) tablet Take 1 Tab by mouth two (2) times daily (with meals). 60 Tab 6 No current facility-administered medications on file prior to visit. Past Medical History:  
Diagnosis Date  GERD (gastroesophageal reflux disease)  Hypertension  Iron deficiency anemia, unspecified  Long term (current) use of anticoagulants Hx of pulmonary embolism in 5 yrs ago and November 2013.  Lymphedema  Migraine  Migraine  Other and unspecified symptoms and signs involving general sensations and perceptions migraine  Other ill-defined conditions(799.89)   
 anemia  Other vitamin B12 deficiency anemia  Thromboembolus ([facility] [address] deena PE x2 Review of Systems Constitutional:  
     Review of systems positive for anxiety, fatigue, memory loss Neurological: Positive for dizziness. Physical Exam  
Constitutional: She is oriented to person, place, and time. She appears well-developed. No distress. Lymphadenopathy:  
  She has no cervical adenopathy. Neurological: She is alert and oriented to person, place, and time. A 
Skin: Skin is warm and dry. No rash noted. She is not diaphoretic. No erythema. Psychiatric: She has a normal mood and affect. Her behavior is normal. Thought content normal.  
 
 
ASSESSMENT and PLAN 
MIGRAINE She will continue to take her generic Paxil 20 mg a day, I am not sure that it is helping with the migraine or not. I have written her prescription for generic Fioricet 40 per month , I have given her a website that will tell her the cheapest place that she can go to pay cash for this drug. She can take one half or 1 of these pills with what ever over-the-counter combination she usually takes in efforts to get some headache relief, I do not think this is migraine now I think this is chronic daily headache but this may help. Vertigo I am going to try adding some amitriptyline 10 mg at night increasing weekly until she gets 30 mg at night, I have given her a written prescription for this that she can again go to the best price place using the website I supplied with her. I will see her back in 6 months, I told her to cancel that appointment if she is doing well and I will see her back in 1 year at that time. This note will not be viewable in [address]

## 2018-09-06 NOTE — PATIENT INSTRUCTIONS
Office Policies  o Phone calls/patient messages:  Please allow up to 24 hours for someone in the office to contact you about your call or message. Be mindful your provider may be out of the office or your message may require further review. We encourage you to use [employer] for your messages as this is a faster, more efficient way to communicate with our office  o Medication Refills:  Prescription medications require up to 48 business hours to process. We encourage you to use [employer] for your refills. For controlled medications: Please allow up to 72 business hours to process. Certain medications may require you to  a written prescription at our office. NO narcotic/controlled medications will be prescribed after 4pm Monday through Friday or on weekends  o Form/Paperwork Completion:  Please note there is a $25 fee for all paperwork completed by our providers. We ask that you allow 7-14 business days. Pre-payment is due prior to picking up/faxing the completed form. You may also download your forms to [employer] to have your doctor print off.  o Test Results: In order for a patient to obtain test results, an appointment must be made with the physician to review the results. Test results cannot be discussed over the phone.

## 2018-11-28 NOTE — TELEPHONE ENCOUNTER
Left message for patient to call back with clarification of how she is taking amitriptyline  [facility] pharmacy requesting 90 day refill , just need dose she is taking

## 2018-11-30 RX ORDER — AMITRIPTYLINE HYDROCHLORIDE 25 MG/1
TABLET, FILM COATED ORAL
Qty: 30 TAB | Refills: 5 | Status: SHIPPED | OUTPATIENT
Start: 2018-11-30 | End: 2019-03-05 | Stop reason: SDUPTHER

## 2018-11-30 NOTE — TELEPHONE ENCOUNTER
Spoke to patient and she does not want 90 day script  For amitriptyline only 30 day   She is taking 1 at night

## 2019-03-05 ENCOUNTER — TELEPHONE (OUTPATIENT)
Dept: NEUROLOGY | Age: 54
End: 2019-03-05

## 2019-03-05 RX ORDER — AMITRIPTYLINE HYDROCHLORIDE 25 MG/1
TABLET, FILM COATED ORAL
Qty: 90 TAB | Refills: 3 | Status: SHIPPED | OUTPATIENT
Start: 2019-03-05 | End: 2019-03-06 | Stop reason: SDUPTHER

## 2019-03-05 NOTE — TELEPHONE ENCOUNTER
Received 90 day refill request for amitriptyline from [facility] pharmacy   Last filled 11/30/18  Dr. Ute Zhao refill

## 2019-03-06 RX ORDER — AMITRIPTYLINE HYDROCHLORIDE 25 MG/1
TABLET, FILM COATED ORAL
Qty: 90 TAB | Refills: 3 | Status: SHIPPED | OUTPATIENT
Start: 2019-03-06 | End: 2019-11-05

## 2019-06-03 RX ORDER — BUTALBITAL, ACETAMINOPHEN AND CAFFEINE 50; 325; 40 MG/1; MG/1; MG/1
1 TABLET ORAL
Qty: 40 TAB | Refills: 5 | Status: SHIPPED | OUTPATIENT
Start: 2019-06-03 | End: 2019-11-05

## 2019-06-04 ENCOUNTER — DOCUMENTATION ONLY (OUTPATIENT)
Dept: NEUROLOGY | Age: 54
End: 2019-06-04

## 2019-10-03 ENCOUNTER — TELEPHONE (OUTPATIENT)
Dept: NEUROLOGY | Age: 54
End: 2019-10-03

## 2019-10-08 RX ORDER — PAROXETINE HYDROCHLORIDE 20 MG/1
TABLET, FILM COATED ORAL
Qty: 30 TAB | Refills: 0 | Status: SHIPPED | OUTPATIENT
Start: 2019-10-08 | End: 2019-11-05 | Stop reason: SDUPTHER

## 2019-11-04 NOTE — PROGRESS NOTES
Neurology Note    Patient ID:  David Paige  [id number]  63 y.o.  1965      Date of Consultation:  November 5, 2019    Referring Physician: MARTHA Keenan    Reason for Consultation:  Headaches and dizziness    Subjective: headaches       History of Present Illness:   David Paige is a 48 y.o. female who returns today for an evaluation in the neurology at [facility].  She was last seen in clinic by a former neurologist, Dr. Yokasta Jamil, in September 2018. She was being seen for headaches. She has seen another neurologist at [facility] prior to this. At that time she was having multiple headache days per month. She was getting photophobia and some nausea with the headaches. She was taking Paxil 20 mg a day for migraine prophylaxis. She did not feel at that visit that the Paxil was helping much. At that visit she was prescribed amitriptyline with to help with her migraines and intermittent vertigo as well as given a prescription for Fioricet. From talking with the patient, she states that she has had migraines since the age of 13. She was never truly on a prophylactic medication dedicated for her migraines until the amitriptyline was started. Currently she is taking approximately 40 Fioricet pills a month as well as taking over-the-counter medications of acetaminophen, aspirin and caffeine. She is taking probably close to 2 of these every day and has been doing this for many years. There have been financial issues in regards to taking other medications by mouth. She has other concerns which were originally being followed including intermittent episodes of dizziness and lightheaded. She had been told that these were vestibular migraines. She did have a CT in 2017 which was normal.  She does state that a lot of her symptoms did worsen 3 years ago when she has been going through a lot of challenges. Her , sister, aunt and mother all passed away.   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 At one point in time she got up to a weight of 500 pounds but has now lost weight down to 310. She states because of this she has very poor sleep. She is also unable to lay flat as she gets anxious and dizzy when attempting to lay flat. She does mention that she also does have a constant ringing in her ears and that her vision has been blurry. She was told that she needs glasses but has not went to get them yet. An object in the room that was 6 feet away she could not make out any of the letters today. Not seen a primary care doctor for some time but has a new visit with one today    Past Medical History:   Diagnosis Date    GERD (gastroesophageal reflux disease)     Hypertension     Iron deficiency anemia, unspecified     Long term (current) use of anticoagulants     Hx of pulmonary embolism in 5 yrs ago and 2013.  Lymphedema     Migraine     Migraine     Other and unspecified symptoms and signs involving general sensations and perceptions     migraine    Other ill-defined conditions(799.89)     anemia    Other vitamin B12 deficiency anemia     Thromboembolus ([address]     deena PE x2        Past Surgical History:   Procedure Laterality Date    HX GYN      c sections x 3    HX GYN  3/17/14    HYSTEROSCOPY WITH THERMOCHOICE ENDOMETRIAL ABLATION    HX TUBAL LIGATION      with last         Family History   Problem Relation Age of Onset    Heart Disease Mother     Cancer Father         stomach    Cancer Sister 39        colon cancer with mets to the liver.         Social History     Tobacco Use    Smoking status: Former Smoker     Last attempt to quit: 1997     Years since quittin.3    Smokeless tobacco: Never Used   Substance Use Topics    Alcohol use: Yes     Comment: occasionally        Allergies   Allergen Reactions    Latex Hives    Oxycodone Nausea and Vomiting    Aspirin Rash     Reports this is no longer an allergy (10/20/2016)        Prior to Admission medications    Medication Sig Start Date End Date Taking? Authorizing Provider   butalbital-acetaminophen-caffeine (FIORICET, ESGIC) -40 mg per tablet Take 1 Tab by mouth every six (6) hours as needed for Pain for up to 30 days. This medication is only to be filled in one month intervals  Indications: This medication is only to be filled in one month intervals 11/5/19 12/5/19 Yes Davin Virk DO   butalbital-acetaminophen-caffeine (FIORICET, ESGIC) -40 mg per tablet Take 1 Tab by mouth every six (6) hours as needed for Pain for up to 30 days. 11/5/19 12/5/19 Yes Davin Virk DO   amitriptyline (ELAVIL) 25 mg tablet 2 p.o. nightly  Indications: Migraine Prevention 11/5/19  Yes Davin Virk DO   PARoxetine (PAXIL) 20 mg tablet Take 1 Tab by mouth daily. Indications: Anxiousness associated with Depression 11/5/19  Yes Davin Virk DO   butalbital-acetaminophen-caffeine (FIORICET, ESGIC) -40 mg per tablet Take 1 Tab by mouth every six (6) hours as needed for Pain for up to 30 days. 11/5/19 12/5/19 Yes Davin Virk DO   diazepam (VALIUM) 5 mg tablet Take 1 Tab by mouth every eight (8) hours as needed for Anxiety. Max Daily Amount: 15 mg. 10/20/16  Yes Flo Redding MD   meclizine (ANTIVERT) 25 mg tablet Take  by mouth three (3) times daily as needed. Provider, Historical   promethazine (PHENERGAN) 25 mg tablet Take 25 mg by mouth every six (6) hours as needed for Nausea. Provider, Historical   ferrous sulfate 325 mg (65 mg iron) tablet Take 1 Tab by mouth two (2) times daily (with meals).  3/12/13   Brenda Thomas MD       Review of Systems:    General, constitutional: Fatigue tiredness  Eyes, vision: Vision is blurry  Ears, nose, throat: Ringing in her ears  Cardiovascular, heart: negative  Respiratory: Snoring at night  Gastrointestinal: negative  Genitourinary: negative  Musculoskeletal: Joint pain  Skin and integumentary: negative  Psychiatric: Anxiety and depression  Endocrine: negative  Neurological: negative, except for HPI  Hematologic/lymphatic: negative  Allergy/immunology: negative    Objective:     Visit Vitals  /78   Pulse 69   Ht 5' 8\" (1.727 m)   Wt 310 lb (140.6 kg)   SpO2 97%   BMI 47.14 kg/m²       Physical Exam:      General:  appears well nourished in no acute distress  Neck: no carotid bruits  Lungs: clear to auscultation  Heart:  no murmurs, regular rate  Lower extremity: peripheral pulses palpable and bilateral edema  Skin: intact    Neurological exam:    Awake, alert, oriented to person, place and time  Recent and remote memory were normal  Attention and concentration were intact  Language was intact. There was no aphasia  Speech: no dysarthria  Fund of knowledge was preserved    Cranial nerves:   II-XII were tested    Perrrla  Fundoscopic examination revealed venous pulsations and no clear abnormalities  Visual acuity is markedly reduced in both eyes. She has trouble reading letters at 6 feet. She is scheduled to get glasses but has not done so yet  Visual fields were full  Eomi, no evidence of nystagmus  Facial sensation:  normal and symmetric  Facial motor: normal and symmetric  Hearing intact  SCM strength intact  Tongue: midline without fasciculations    Motor: Tone normal    No evidence of fasciculations    Strength testing:   deltoid triceps biceps Wrist ext. Wrist flex. intrinsics Hip flex. Hip ext. Knee ext. Knee flex Dorsi flex Plantar flex   Right 5 5 5 5 5 5 5 5 5 5 5 5   Left 5 5 5 5 5 5 5 5 5 5 5 5         Sensory:  Upper extremity: intact to pp, light touch, and vibration > 10 seconds  Lower extremity: intact to pp, light touch, and vibration > 10 seconds    Reflexes:    Right Left  Biceps  2 2  Triceps 2 2  Brachiorad.  2 2  Patella  2 2  Achilles 1 1    Plantar response:  flexor bilaterally      Cerebellar testing:  no tremor apparent, finger/nose and dianne were intact    Romberg: absent    Gait: steady. Heel, toe, and tandem gait were normal, however this was antalgic    Labs:     Lab Results   Component Value Date/Time    Hemoglobin A1c <3.5 (L) 11/30/2013 04:00 AM    Sodium 142 02/23/2017 08:27 PM    Potassium 3.6 02/23/2017 08:27 PM    Chloride 108 02/23/2017 08:27 PM    Glucose 98 02/23/2017 08:27 PM    BUN 10 02/23/2017 08:27 PM    Creatinine 1.02 02/23/2017 08:27 PM    Calcium 10.2 (H) 02/23/2017 08:27 PM    WBC 4.9 02/23/2017 08:27 PM    HCT 33.3 (L) 02/23/2017 08:27 PM    HGB 10.1 (L) 02/23/2017 08:27 PM    PLATELET 770 79/78/3947 08:27 PM       Imaging:    No results found for this or any previous visit. Results from [place] encounter on 02/23/17   CT HEAD WO CONT    Narrative EXAM:  CT HEAD WITHOUT CONTRAST    INDICATION: Dizziness, nausea and numbness in the left hand. COMPARISON: 9/2/2012. CONTRAST: None. TECHNIQUE: Unenhanced CT of the head was performed using 5 mm images. Brain and  bone windows were generated. Sagittal and coronal reformations were generated. CT dose reduction was achieved through use of a standardized protocol tailored  for this examination and automatic exposure control for dose modulation. CT dose  reduction was achieved through use of a standardized protocol tailored for this  examination and automatic exposure control for dose modulation. FINDINGS:  The ventricles and sulci are normal in size, shape and configuration and  midline. There is no significant white matter disease. There is no  intracranial hemorrhage. There is no extra-axial collection, mass, mass effect  or midline shift. The basilar cisterns are open. No acute infarct is  identified. The bone windows demonstrate no abnormalities. The visualized  portions of the paranasal sinuses and mastoid air cells are clear. Impression IMPRESSION: Normal unenhanced CT examination of the head. Assessment and Plan:   Patient is a pleasant 63-year-old with multiple medical problems as noted below which impact her overall neurological health who returns to clinic today with migraines. 1. Chronic daily Migraines with superimposed rebound headaches: Will increase amitriptyline to 50 mg at bedtime  Will wean down the amount of fioricet over the next 3 three months. She should not be taking more than 20 pills per month. This has contributed to her rebound headaches. Will write prescriptions for 35 pills, then 30 pills, and then 20 pills. She will need to stay at 20 pills per month  Discussed how she will need to wean down on her over the counter. She should not be taking more than 20 pills of any over-the-counter medication as well. I did talk about other medications which could be helpful for her headaches and that the including Topamax. There are significant financial limitations and she will look into what the cost of this medication would be. Risk factors for migraines were reviewed with the patient. These include lifestyle modifications, improving exercise, receiving adequate sleep, and monitoring for food triggers. A patient log of migraine frequency, intensity, and possible triggers should be recorded. Stress anxiety:  I do think her stress and anxiety are contributing significantly to her migraines. Possible sleep apnea:  I do wonder if there is a sleep apnea and I did ask her to speak to her primary care doctor today as this could be a contributing factor. She will discuss this with her    Episodes of dizziness and vertigo: These very well may be vertiginous migraines. I think this could be associated with medication overuse. This is something that we will track right now over time and try to get a better sense of triggers.         Patient Active Problem List   Diagnosis Code    Hypertension I10    Lymphedema I89.0    Migraine G43.909    Hiatal hernia K44.9  Iron deficiency anemia, unspecified D50.9    Other vitamin B12 deficiency anemia D51.8    Other pulmonary embolism and infarction I26.99    Morbid obesity (HCC) E66.01    Long term (current) use of anticoagulants Z79.01    Menometrorrhagia N92.1    Dysmenorrhea N94.6    Status post endometrial ablation Z98.890             The patient should return to clinic in 6-12 months    Renewed medication: yes    I spent  40   minutes with the patient  with over 50 % of the time counseling and coordinating the care plan in regards to the diagnosis, diagnostic testing, and treatment plan. The patient had the ability to ask questions and all questions were answered.          Signed By:  Nigel Alpers, DO FAAN    November 5, 2019

## 2019-11-05 ENCOUNTER — APPOINTMENT (OUTPATIENT)
Dept: GENERAL RADIOLOGY | Age: 54
End: 2019-11-05
Attending: EMERGENCY MEDICINE
Payer: MEDICAID

## 2019-11-05 ENCOUNTER — HOSPITAL ENCOUNTER (EMERGENCY)
Age: 54
Discharge: HOME | End: 2019-11-05
Attending: EMERGENCY MEDICINE
Payer: MEDICAID

## 2019-11-05 ENCOUNTER — OFFICE VISIT (OUTPATIENT)
Dept: NEUROLOGY | Age: 54
End: 2019-11-05

## 2019-11-05 ENCOUNTER — APPOINTMENT (OUTPATIENT)
Dept: ULTRASOUND IMAGING | Age: 54
End: 2019-11-05
Attending: EMERGENCY MEDICINE
Payer: MEDICAID

## 2019-11-05 VITALS
BODY MASS INDEX: 44.41 KG/M2 | HEIGHT: 68 IN | HEART RATE: 69 BPM | WEIGHT: 293 LBS | OXYGEN SATURATION: 97 % | SYSTOLIC BLOOD PRESSURE: 140 MMHG | DIASTOLIC BLOOD PRESSURE: 78 MMHG

## 2019-11-05 VITALS
OXYGEN SATURATION: 99 % | TEMPERATURE: 97.9 F | RESPIRATION RATE: 18 BRPM | SYSTOLIC BLOOD PRESSURE: 147 MMHG | DIASTOLIC BLOOD PRESSURE: 88 MMHG | HEART RATE: 87 BPM | HEIGHT: 68 IN | BODY MASS INDEX: 44.41 KG/M2 | WEIGHT: 293 LBS

## 2019-11-05 DIAGNOSIS — E66.09 OBESITY DUE TO EXCESS CALORIES WITHOUT SERIOUS COMORBIDITY, UNSPECIFIED CLASSIFICATION: ICD-10-CM

## 2019-11-05 DIAGNOSIS — M25.561 ACUTE PAIN OF RIGHT KNEE: Primary | ICD-10-CM

## 2019-11-05 DIAGNOSIS — G43.909 MIGRAINE WITHOUT STATUS MIGRAINOSUS, NOT INTRACTABLE, UNSPECIFIED MIGRAINE TYPE: Primary | ICD-10-CM

## 2019-11-05 DIAGNOSIS — F33.9 RECURRENT MAJOR DEPRESSIVE DISORDER, REMISSION STATUS UNSPECIFIED (HCC): ICD-10-CM

## 2019-11-05 PROCEDURE — 96372 THER/PROPH/DIAG INJ SC/IM: CPT

## 2019-11-05 PROCEDURE — 93971 EXTREMITY STUDY: CPT

## 2019-11-05 PROCEDURE — 74011250637 HC RX REV CODE- 250/637: Performed by: EMERGENCY MEDICINE

## 2019-11-05 PROCEDURE — 99284 EMERGENCY DEPT VISIT MOD MDM: CPT

## 2019-11-05 PROCEDURE — 73562 X-RAY EXAM OF KNEE 3: CPT

## 2019-11-05 PROCEDURE — 74011250636 HC RX REV CODE- 250/636: Performed by: EMERGENCY MEDICINE

## 2019-11-05 RX ORDER — BUTALBITAL, ACETAMINOPHEN AND CAFFEINE 50; 325; 40 MG/1; MG/1; MG/1
1 TABLET ORAL
Qty: 30 TAB | Refills: 0 | Status: SHIPPED | OUTPATIENT
Start: 2019-11-05 | End: 2019-11-05 | Stop reason: SDUPTHER

## 2019-11-05 RX ORDER — BUTALBITAL, ACETAMINOPHEN AND CAFFEINE 50; 325; 40 MG/1; MG/1; MG/1
1 TABLET ORAL
Qty: 30 TAB | Refills: 0 | Status: SHIPPED | OUTPATIENT
Start: 2019-11-05 | End: 2019-12-05

## 2019-11-05 RX ORDER — AMITRIPTYLINE HYDROCHLORIDE 25 MG/1
TABLET, FILM COATED ORAL
Qty: 90 TAB | Refills: 3 | Status: SHIPPED | OUTPATIENT
Start: 2019-11-05 | End: 2020-07-07 | Stop reason: SDUPTHER

## 2019-11-05 RX ORDER — ACETAMINOPHEN 500 MG
1000 TABLET ORAL ONCE
Status: COMPLETED | OUTPATIENT
Start: 2019-11-05 | End: 2019-11-05

## 2019-11-05 RX ORDER — KETOROLAC TROMETHAMINE 30 MG/ML
15 INJECTION, SOLUTION INTRAMUSCULAR; INTRAVENOUS ONCE
Status: COMPLETED | OUTPATIENT
Start: 2019-11-05 | End: 2019-11-05

## 2019-11-05 RX ORDER — PAROXETINE HYDROCHLORIDE 20 MG/1
20 TABLET, FILM COATED ORAL DAILY
Qty: 30 TAB | Refills: 5 | Status: SHIPPED | OUTPATIENT
Start: 2019-11-05 | End: 2020-07-07 | Stop reason: SDUPTHER

## 2019-11-05 RX ORDER — BUTALBITAL, ACETAMINOPHEN AND CAFFEINE 50; 325; 40 MG/1; MG/1; MG/1
1 TABLET ORAL
Qty: 20 TAB | Refills: 0 | Status: SHIPPED | OUTPATIENT
Start: 2019-11-05 | End: 2019-12-05

## 2019-11-05 RX ORDER — BUTALBITAL, ACETAMINOPHEN AND CAFFEINE 50; 325; 40 MG/1; MG/1; MG/1
1 TABLET ORAL
Qty: 35 TAB | Refills: 0 | Status: SHIPPED | OUTPATIENT
Start: 2019-11-05 | End: 2019-12-05

## 2019-11-05 RX ADMIN — KETOROLAC TROMETHAMINE 15 MG: 30 INJECTION, SOLUTION INTRAMUSCULAR at 12:07

## 2019-11-05 RX ADMIN — ACETAMINOPHEN 1000 MG: 500 TABLET ORAL at 12:07

## 2019-11-05 NOTE — ED NOTES
Pt arrives to ED with complaints of R knee pain x3 weeks. Patient states that she was sitting down at her kitchen table and as she stood up she felt \"something crack\". Patient states that pain has gradually increased overtime. Family at the bedside.

## 2019-11-05 NOTE — ED PROVIDER NOTES
EMERGENCY DEPARTMENT HISTORY AND PHYSICAL EXAM      Date: 11/5/2019  Patient Name: Mallika Zimmer  Patient Age and Sex: 48 y.o. female     History of Presenting Illness     Chief Complaint   Patient presents with    Knee Pain     Pt ambulatory to triage with c/o R knee swelling x 2 weeks, no known injury or fall; pt with hx of lymphedema, states entire R leg swollen       History Provided By: Patient    HPI: Mallika Zimmer  Is a 57-year-old female with past medical history of migraines presenting today with right knee pain. Patient reports that she has a history of lymphedema and started having right knee pain around 2 weeks ago that has steadily worsened. She reports a clicking sound in her right knee and pain in the anterior side. She denies any fevers, chills, redness, or prior history of injury to this knee. She reports she has scheduled a appointment with her new primary care physician to evaluate her for her chronic medical problems. She has this appointment today. She has not been taking her medications for her pain at this point. Denies chest pain or shortness of breath. There are no other complaints, changes, or physical findings at this time. PCP: None    No current facility-administered medications on file prior to encounter. Current Outpatient Medications on File Prior to Encounter   Medication Sig Dispense Refill    butalbital-acetaminophen-caffeine (FIORICET, ESGIC) -40 mg per tablet Take 1 Tab by mouth every six (6) hours as needed for Pain for up to 30 days. This medication is only to be filled in one month intervals  Indications: This medication is only to be filled in one month intervals 35 Tab 0    amitriptyline (ELAVIL) 25 mg tablet 2 p.o. nightly  Indications: Migraine Prevention 90 Tab 3    PARoxetine (PAXIL) 20 mg tablet Take 1 Tab by mouth daily.  Indications: Anxiousness associated with Depression 30 Tab 5    butalbital-acetaminophen-caffeine (FIORICET, ESGIC) -40 mg per tablet Take 1 Tab by mouth every six (6) hours as needed for Pain for up to 30 days. 20 Tab 0    butalbital-acetaminophen-caffeine (FIORICET, ESGIC) -40 mg per tablet Take 1 Tab by mouth every six (6) hours as needed for Pain for up to 30 days. 30 Tab 0    [DISCONTINUED] butalbital-acetaminophen-caffeine (FIORICET, ESGIC) -40 mg per tablet Take 1 Tab by mouth every six (6) hours as needed for Pain for up to 30 days. 30 Tab 0    [DISCONTINUED] PARoxetine (PAXIL) 20 mg tablet TAKE ONE TABLET BY MOUTH ONCE DAILY FOR ANXIETY WITH DEPRESSION 30 Tab 0    [DISCONTINUED] butalbital-acetaminophen-caffeine (FIORICET, ESGIC) -40 mg per tablet Take 1 Tab by mouth every six (6) hours as needed for Pain. This medication is only to be filled in one month intervals  Indications: This medication is only to be filled in one month intervals 40 Tab 5    [DISCONTINUED] amitriptyline (ELAVIL) 25 mg tablet 1 p.o. nightly 90 Tab 3    [DISCONTINUED] PARoxetine (PAXIL) 20 mg tablet Take 1 Tab by mouth daily. Indications: ANXIETY WITH DEPRESSION 30 Tab 5    [DISCONTINUED] butalbital-acetaminophen-caffeine (FIORICET, ESGIC) -40 mg per tablet One half or 1 p.o. twice daily as needed headache, may refill monthly  Indications: Migraine 30 Tab 5    meclizine (ANTIVERT) 25 mg tablet Take  by mouth three (3) times daily as needed.  promethazine (PHENERGAN) 25 mg tablet Take 25 mg by mouth every six (6) hours as needed for Nausea.  diazepam (VALIUM) 5 mg tablet Take 1 Tab by mouth every eight (8) hours as needed for Anxiety. Max Daily Amount: 15 mg. 20 Tab 0    ferrous sulfate 325 mg (65 mg iron) tablet Take 1 Tab by mouth two (2) times daily (with meals).  61 Tab 6       Past History     Past Medical History:  Past Medical History:   Diagnosis Date    GERD (gastroesophageal reflux disease)     Hypertension     Iron deficiency anemia, unspecified     Long term (current) use of anticoagulants     Hx of pulmonary embolism in 5 yrs ago and 2013.  Lymphedema     Migraine     Migraine     Other and unspecified symptoms and signs involving general sensations and perceptions     migraine    Other ill-defined conditions(799.89)     anemia    Other vitamin B12 deficiency anemia     Thromboembolus ([address]     deena PE x2       Past Surgical History:  Past Surgical History:   Procedure Laterality Date    HX GYN      c sections x 3    HX GYN  3/17/14    HYSTEROSCOPY WITH THERMOCHOICE ENDOMETRIAL ABLATION    HX TUBAL LIGATION      with last        Family History:  Family History   Problem Relation Age of Onset    Heart Disease Mother     Cancer Father         stomach    Cancer Sister 39        colon cancer with mets to the liver. Social History:  Social History     Tobacco Use    Smoking status: Former Smoker     Last attempt to quit: 1997     Years since quittin.3    Smokeless tobacco: Never Used   Substance Use Topics    Alcohol use: Yes     Comment: occasionally    Drug use: No       Allergies: Allergies   Allergen Reactions    Latex Hives    Oxycodone Nausea and Vomiting    Aspirin Rash     Reports this is no longer an allergy (10/20/2016)         Review of Systems   Constitutional: No  fever,  No  headache  Skin: No  rash, No  jaundice  HEENT: No  nasal congestion, No  eye drainage.    Resp: No cough,  No  wheezing  CV: No chest pain, No  palpitations  GI: No vomiting,  No  diarrhea.,  No  constipation  : No dysuria,  No  hematuria  MSK: + joint pain,  No  trauma  Neuro: No numbness, No  tingling  Psych: No suicidal, No  paranoid      Physical Exam     Patient Vitals for the past 12 hrs:   Temp Pulse Resp BP SpO2   19 1345    147/88 99 %   19 1330     100 %   19 1315    127/74 100 %   19 1300    142/70 100 %   19 1249     93 %   19 1245  87  141/81    19 1114 97.9 °F (36.6 °C) (!) 130 18 153/89 100 %       General: alert, No acute distress  Eyes: EOMI, normal conjunctiva  ENT: moist mucous membranes. Neck: Active, full ROM of neck. Skin: No rashes. no jaundice              Lungs: Equal chest expansion. no respiratory distress. Heart: regular rate     no peripheral edema    Abd:  non distended soft  Back: Full ROM  MSK: Right knee tenderness to palpation with pain in the anterior knee, no erythema, intact range of motion, swelling to this right leg from the knee down  Neuro: alert  Person, Place, Time and Situation; normal speech;   Psych: Cooperative with exam; Appropriate mood and affect             Diagnostic Study Results     Labs -   No results found for this or any previous visit (from the past 12 hour(s)). Radiologic Studies -   XR KNEE RT 3 V   Final Result   IMPRESSION: No acute fracture or dislocation. CT Results  (Last 48 hours)    None        CXR Results  (Last 48 hours)    None            Medical Decision Making     Differential Diagnosis: Arthritis, DVT, lymphedema, gout    I reviewed the vital signs, available nursing notes, past medical history, past surgical history, family history and social history and old medical records. On my interpretation of the radiology studies plain film shows no acute fracture, ultrasound shows no evidence of DVT    Management/ED course: Patient presents today with right knee pain that has been progressive over the past 2 weeks. She is obese and has a history of lymphedema. This knee and entire leg is swollen, no evidence of DVT on ultrasound, x-ray is unremarkable. She has a follow-up with a new primary care provider today. I encouraged her to discuss this problem with the primary care provider and the patient is discharged with plans for oral analgesics. Dispo: Discharged. The patient has been re-evaluated and is ready for discharge. Reviewed available results with patient.  Counseled patient on diagnosis and care plan. Patient has expressed understanding, and all questions have been answered. Patient agrees with plan and agrees to follow up as recommended, or to return to the ED if their symptoms worsen. Discharge instructions have been provided and explained to the patient, along with reasons to return to the ED. PLAN:  Discharge Medication List as of 11/5/2019  1:18 PM        2. Follow-up Information     Follow up With Specialties Details Why Contact Info    PCP    As scheduled        3. Return to ED if worse     Diagnosis     Clinical Impression:   1.  Acute pain of right knee        Attestations:    Bryce Fuchs MD

## 2019-11-05 NOTE — ED NOTES
Dr Jason Fan reviewed discharge instructions with the patient. The patient verbalized understanding. All questions and concerns were addressed. The patient declined a wheelchair and is discharged ambulatory in the care of family members with instructions and prescriptions in hand. Pt is alert and oriented x 4. Respirations are clear and unlabored.

## 2020-04-07 ENCOUNTER — TELEPHONE (OUTPATIENT)
Dept: NEUROLOGY | Age: 55
End: 2020-04-07

## 2020-04-07 RX ORDER — BUTALBITAL, ACETAMINOPHEN AND CAFFEINE 50; 325; 40 MG/1; MG/1; MG/1
1 TABLET ORAL
Qty: 20 TAB | Refills: 3 | Status: SHIPPED | OUTPATIENT
Start: 2020-04-07 | End: 2020-09-01 | Stop reason: SDUPTHER

## 2020-07-06 NOTE — PROGRESS NOTES
Neurology Note    Patient ID:  Valdo Iglesias  [id number]  74 y.o.  1965      Date of Consultation:  July 7, 2020    Referring Physician: MARTHA Vail    Reason for Consultation:  Headaches and dizziness    Subjective: I still have headaches       History of Present Illness:   Valdo Iglesias is a 50 y.o. female who returns today to the neurology clinic at [facility].  She was last seen by me as a first encounter on November 5, 2019. Please see my history of present illness, examination, and treatment based plan from that day. She is a longstanding history of migraine headaches and had been seen by multiple other neurologist in the past.    At that visit, she was taking Paxil 20 mg and amitriptyline 50 mg a day for migraine prophylaxis and Fioricet for abortive therapy. She was taking a significant high amount of Fioricet and she was ultimately weaned down to 20/month. She also did have episodes of dizziness, which she had been told was vertiginous migraines, and lightheadedness as well as poor sleep, tinnitus and blurry vision. Since that time, she did have a new PCP and ultimately doing her blood work was found to have a hemoglobin that dropped down to 5. She was admitted to [facility].  She needed blood transfusions as well as iron transfusions. She was placed on vitamin B12 injections and vitamin D pills. She states that she is unsure what the exact etiology for the anemia was. She was hospitalized for 1 week. Her hemoglobin has improved since then. She was diagnosed with a hiatal hernia. She still does get pain in her stomach with eating. She drinks a lot of milk which helps. She does follow closely with her primary care doctor but does not have a gastroenterologist.    She is taking Bumex to help with her lower extremity swelling. She stopped taking her amitriptyline over 2 months ago.   She states that she ran out of the medication and her pharmacy tried to call for refills. We do not have any documentation of that. She does continue to take the Fioricet up to 20 times a month. She still does get notable nausea and vomiting associated with her migraine headaches. They are still frontal in nature but she is getting more in the posterior neck region as well. These headaches may last all day. She still can continues to struggle with sleep. She is also under a lot of stress that she is helping in  of relatives of hers who are young and his age for a couple weeks each month. Past Medical History:   Diagnosis Date    GERD (gastroesophageal reflux disease)     Hypertension     Iron deficiency anemia, unspecified     Long term (current) use of anticoagulants     Hx of pulmonary embolism in 5 yrs ago and 2013.  Lymphedema     Migraine     Migraine     Other and unspecified symptoms and signs involving general sensations and perceptions     migraine    Other ill-defined conditions(799.89)     anemia    Other vitamin B12 deficiency anemia     Thromboembolus ([address]     deena PE x2        Past Surgical History:   Procedure Laterality Date    HX GYN      c sections x 3    HX GYN  3/17/14    HYSTEROSCOPY WITH THERMOCHOICE ENDOMETRIAL ABLATION    HX TUBAL LIGATION      with last         Family History   Problem Relation Age of Onset    Heart Disease Mother     Cancer Father         stomach    Cancer Sister 39        colon cancer with mets to the liver. Social History     Tobacco Use    Smoking status: Former Smoker     Last attempt to quit: 1997     Years since quittin.0    Smokeless tobacco: Never Used   Substance Use Topics    Alcohol use: Yes     Comment: occasionally        Allergies   Allergen Reactions    Latex Hives    Oxycodone Nausea and Vomiting    Aspirin Rash     Reports this is no longer an allergy (10/20/2016)        Prior to Admission medications    Medication Sig Start Date End Date Taking? Authorizing Provider   MELOXICAM PO Take  by mouth. Yes Provider, Historical   omeprazole (PRILOSEC) 40 mg capsule TAKE 1 CAPSULE BY MOUTH EVERY DAY 5/18/20  Yes Provider, Historical   lisinopriL (PRINIVIL, ZESTRIL) 20 mg tablet TAKE 2 TABLETS EVERY DAY (10MG UNVAIL) 6/17/20  Yes Provider, Historical   amitriptyline (ELAVIL) 25 mg tablet 2 p.o. nightly  Indications: migraine prevention 7/7/20  Yes Davin Virk DO   PARoxetine (PAXIL) 20 mg tablet Take 1 Tab by mouth daily. Indications: anxiousness associated with depression 7/7/20  Yes Davin Virk DO   ondansetron (ZOFRAN ODT) 4 mg disintegrating tablet Take 1 Tab by mouth every eight (8) hours as needed for Nausea or Vomiting (associated with migraines). 7/7/20  Yes Davin Virk DO   butalbital-acetaminophen-caffeine (FIORICET, ESGIC) -40 mg per tablet Take 1 Tab by mouth daily as needed for Headache or Migraine. The 20 pill prescription should last 30 days. No early refills of the medication. 4/7/20  Yes Davin Virk DO   ferrous sulfate 325 mg (65 mg iron) tablet Take 1 Tab by mouth two (2) times daily (with meals). 3/12/13  Yes Rocael Salcido MD   meclizine (ANTIVERT) 25 mg tablet Take  by mouth three (3) times daily as needed. Provider, Historical   diazepam (VALIUM) 5 mg tablet Take 1 Tab by mouth every eight (8) hours as needed for Anxiety.  Max Daily Amount: 15 mg. 10/20/16   Rosendo Limon MD     Review of Systems:    General, constitutional: Fatigue tiredness  Eyes, vision: Vision is blurry  Ears, nose, throat: Ringing in her ears  Cardiovascular, heart: negative  Respiratory: Snoring at night  Gastrointestinal: negative  Genitourinary: negative  Musculoskeletal: Joint pain  Skin and integumentary: negative  Psychiatric: Anxiety and depression  Endocrine: negative  Neurological: negative, except for HPI  Hematologic/lymphatic: negative  Allergy/immunology: negative    Objective:     Visit Vitals  /80   Pulse 85   Temp 98.2 °F (36.8 °C) (Oral)   Resp 18   Ht 5' 8\" (1.727 m)   Wt 343 lb (155.6 kg)   SpO2 98%   BMI 52.15 kg/m²       Physical Exam:    General:  appears well nourished in no acute distress. She does look fatigued and exhausted  Neck: no carotid bruits  Lungs: clear to auscultation  Heart:  no murmurs, regular rate  Lower extremity: peripheral pulses palpable and bilateral edema  Skin: intact    Neurological exam:    Awake, alert, oriented to person, place and time  Recent and remote memory were normal  Attention and concentration were intact  Language was intact. There was no aphasia  Speech: no dysarthria  Fund of knowledge was preserved    Cranial nerves:   II-XII were tested    Perrrla  Visual fields were full  Eomi, no evidence of nystagmus  Facial sensation:  normal and symmetric  Facial motor: normal and symmetric  Hearing intact  SCM strength intact  Tongue: midline without fasciculations    Motor: Tone normal    No evidence of fasciculations    Strength testing:   deltoid triceps biceps Wrist ext. Wrist flex. intrinsics Hip flex. Hip ext. Knee ext. Knee flex Dorsi flex Plantar flex   Right 5 5 5 5 5 5 5 5 5 5 5 5   Left 5 5 5 5 5 5 5 5 5 5 5 5         Sensory:  Upper extremity: intact to pp, light touch, and vibration  Lower extremity: intact to pp, light touch, and vibration     Reflexes:    Right Left  Biceps  2 2  Triceps 2 2  Brachiorad. 2 2  Patella  2 2  Achilles 1 1    Plantar response:  flexor bilaterally      Cerebellar testing:  no tremor apparent, finger/nose and dianne were intact    Romberg: absent    Gait: steady.       Labs:     Lab Results   Component Value Date/Time    Hemoglobin A1c <3.5 (L) 11/30/2013 04:00 AM    Sodium 142 02/23/2017 08:27 PM    Potassium 3.6 02/23/2017 08:27 PM    Chloride 108 02/23/2017 08:27 PM    Glucose 98 02/23/2017 08:27 PM    BUN 10 02/23/2017 08:27 PM    Creatinine 1.02 02/23/2017 08:27 PM    Calcium 10.2 (H) 02/23/2017 08:27 PM    WBC 4.9 02/23/2017 08:27 PM    HCT 33.3 (L) 02/23/2017 08:27 PM    HGB 10.1 (L) 02/23/2017 08:27 PM    PLATELET 480 25/38/0143 08:27 PM       Imaging:    No results found for this or any previous visit. Results from [place] encounter on 02/23/17   CT HEAD WO CONT    Narrative EXAM:  CT HEAD WITHOUT CONTRAST    INDICATION: Dizziness, nausea and numbness in the left hand. COMPARISON: 9/2/2012. CONTRAST: None. TECHNIQUE: Unenhanced CT of the head was performed using 5 mm images. Brain and  bone windows were generated. Sagittal and coronal reformations were generated. CT dose reduction was achieved through use of a standardized protocol tailored  for this examination and automatic exposure control for dose modulation. CT dose  reduction was achieved through use of a standardized protocol tailored for this  examination and automatic exposure control for dose modulation. FINDINGS:  The ventricles and sulci are normal in size, shape and configuration and  midline. There is no significant white matter disease. There is no  intracranial hemorrhage. There is no extra-axial collection, mass, mass effect  or midline shift. The basilar cisterns are open. No acute infarct is  identified. The bone windows demonstrate no abnormalities. The visualized  portions of the paranasal sinuses and mastoid air cells are clear. Impression IMPRESSION: Normal unenhanced CT examination of the head. Assessment and Plan:   Patient is a pleasant 61-year-old with multiple medical problems as noted below which impact her overall neurological health who returns to clinic today with migraines. 1. Chronic daily Migraines with superimposed rebound headaches. There is also a component of vertiginous migraines:    I will restart her amitriptyline at 50 mg at bedtime  She will continue and Fioricet as 20 pills/month. She also does take Paxil 20 mg a day which helps with depression but may also help with her migraine headaches.     If things do continue at such a great severity, may need to consider increasing the doses of 1 of those 2 medications or start Topamax. Financial limitations are major concern for her. Risk factors for migraines were reviewed with the patient. These include lifestyle modifications, improving exercise, receiving adequate sleep, and monitoring for food triggers. A patient log of migraine frequency, intensity, and possible triggers should be recorded. Stress and anxiety and depression:  I do think her stress and anxiety are contributing significantly to her migraines. She will continue with her Paxil    Possible sleep apnea:  He still needs to follow-up with this and her primary care doctor    Episodes of dizziness and vertigo: These very well may be vertiginous migraines. I think this could be associated with medication overuse. This is something that we will track right now over time and try to get a better sense of triggers. Patient Active Problem List   Diagnosis Code    Hypertension I10    Lymphedema I89.0    Migraine G43.909    Hiatal hernia K44.9    Iron deficiency anemia, unspecified D50.9    Other vitamin B12 deficiency anemia D51.8    Other pulmonary embolism and infarction I26.99    Morbid obesity (HCC) E66.01    Long term (current) use of anticoagulants Z79.01    Menometrorrhagia N92.1    Dysmenorrhea N94.6    Status post endometrial ablation Z98.890             The patient should return to clinic in 6-12 months    Renewed medication: yes. Side effects and toxicity of the medications were reviewed in detail. I spent  25   minutes with the patient  with over 50 % of the time counseling and coordinating the care plan in regards to the diagnosis, diagnostic testing, and treatment plan. The patient had the ability to ask questions and all questions were answered.          Signed By:  Graham Sibley DO FAAN    July 7, 2020

## 2020-07-07 ENCOUNTER — OFFICE VISIT (OUTPATIENT)
Dept: NEUROLOGY | Age: 55
End: 2020-07-07

## 2020-07-07 VITALS
BODY MASS INDEX: 44.41 KG/M2 | OXYGEN SATURATION: 98 % | WEIGHT: 293 LBS | SYSTOLIC BLOOD PRESSURE: 124 MMHG | TEMPERATURE: 98.2 F | RESPIRATION RATE: 18 BRPM | HEIGHT: 68 IN | DIASTOLIC BLOOD PRESSURE: 80 MMHG | HEART RATE: 85 BPM

## 2020-07-07 DIAGNOSIS — G43.909 MIGRAINE WITHOUT STATUS MIGRAINOSUS, NOT INTRACTABLE, UNSPECIFIED MIGRAINE TYPE: Primary | ICD-10-CM

## 2020-07-07 DIAGNOSIS — F33.9 RECURRENT MAJOR DEPRESSIVE DISORDER, REMISSION STATUS UNSPECIFIED (HCC): ICD-10-CM

## 2020-07-07 RX ORDER — OMEPRAZOLE 40 MG/1
CAPSULE, DELAYED RELEASE ORAL
COMMUNITY
Start: 2020-05-18

## 2020-07-07 RX ORDER — ONDANSETRON 4 MG/1
4 TABLET, ORALLY DISINTEGRATING ORAL
Qty: 10 TAB | Refills: 5 | Status: SHIPPED | OUTPATIENT
Start: 2020-07-07 | End: 2021-01-17 | Stop reason: SDUPTHER

## 2020-07-07 RX ORDER — LISINOPRIL 10 MG/1
TABLET ORAL DAILY
COMMUNITY
End: 2020-07-07 | Stop reason: SDUPTHER

## 2020-07-07 RX ORDER — LISINOPRIL 20 MG/1
TABLET ORAL
COMMUNITY
Start: 2020-06-17

## 2020-07-07 RX ORDER — PAROXETINE HYDROCHLORIDE 20 MG/1
20 TABLET, FILM COATED ORAL DAILY
Qty: 90 TAB | Refills: 3 | Status: SHIPPED | OUTPATIENT
Start: 2020-07-07 | End: 2021-06-21

## 2020-07-07 RX ORDER — AMITRIPTYLINE HYDROCHLORIDE 25 MG/1
TABLET, FILM COATED ORAL
Qty: 180 TAB | Refills: 3 | Status: SHIPPED | OUTPATIENT
Start: 2020-07-07 | End: 2021-01-14

## 2020-09-02 RX ORDER — BUTALBITAL, ACETAMINOPHEN AND CAFFEINE 50; 325; 40 MG/1; MG/1; MG/1
1 TABLET ORAL
Qty: 20 TAB | Refills: 3 | Status: SHIPPED | OUTPATIENT
Start: 2020-09-02 | End: 2021-01-17 | Stop reason: SDUPTHER

## 2021-01-13 NOTE — PROGRESS NOTES
Neurology Note    Patient ID:  Roxie Tyson  [id number]  2 y.o.  1965      Date of Consultation:  January 13, 2021  Roxie Tyson is a 50 y.o. female, evaluated via audio-only technology on 1/14/2021 for Follow-up  . Assessment & Plan:    Patient is a pleasant 53-year-old with multiple medical problems as noted below which impact her overall neurological health who returns to clinic today with migraines. 1. Chronic daily Migraines with superimposed rebound headaches. There is also a component of vertiginous migraines:    I will increase her amitriptyline to 75 mg at bedtime. Side effects were reviewed. She will continue Fioricet as 20 pills/month. She also does take Paxil 20 mg a day which helps with depression but may also help with her migraine headaches. If things do continue at such a great severity and the higher dose of amitriptyline isn't enough, I may need to consider starting topamax. We reviewed this today. Financial limitations are major concern for her. Risk factors for migraines were reviewed with the patient. These include lifestyle modifications, improving exercise, receiving adequate sleep, and monitoring for food triggers. A patient log of migraine frequency, intensity, and possible triggers should be recorded. Stress and anxiety and depression:  I do think her stress and anxiety are contributing significantly to her migraines. She will continue with her Paxil  She should follow-up closely with her primary care doctor. Possible sleep apnea:  He still needs to follow-up with this and her primary care doctor    Episodes of dizziness and vertigo: These very well may be vertiginous migraines. I think this could be associated with medication overuse. This is something that we will track right now over time and try to get a better sense of triggers.   Meclizine as needed     12  Subjective:   Roxie Tyson is a 50 y.o. female who returns to clinic at [facility] for evaluation. She was last seen in clinic on July 7, 2020. Please see my history of present illness, examination, and treatment base plan from that day. She has a longstanding history of migraine headaches and was seen by multiple prior neurologist before my initial encounters with her. Since her last visit, she has continued to take Paxil 20 mg and amitriptyline 50 mg a day for migraine prophylaxis. She was also taking Fioricet for abortive care. She has been under a lot of stress. Over the the last few months it has been the anniversary of the death of multiple family members which has been very emotionally difficult for her. She has had some level of a headache almost for the last 6 weeks now. This is very frustrating for her. At days it is less and she will just take an over-the-counter medication. Sunday it is stronger and she will need to take a Fioricet. She is not taking more than 20 in 1 month. She still does have the intermittent episodes of dizziness which she has been told were vertiginous migraines. She has not been taking the meclizine recently. There has been no hospitalizations. She states that her hemoglobin has remained stable. She continues to take her vitamin supplementation. Prior to Admission medications    Medication Sig Start Date End Date Taking? Authorizing Provider   butalbital-acetaminophen-caffeine (FIORICET, ESGIC) -40 mg per tablet Take 1 Tab by mouth daily as needed for Headache or Migraine. The 20 pill prescription should last 30 days. No early refills of the medication. 9/2/20  Yes Davin Virk, DO   MELOXICAM PO Take  by mouth. Yes Provider, Historical   omeprazole (PRILOSEC) 40 mg capsule TAKE 1 CAPSULE BY MOUTH EVERY DAY 5/18/20  Yes Provider, Historical   lisinopriL (PRINIVIL, ZESTRIL) 20 mg tablet TAKE 2 TABLETS EVERY DAY (10MG UNVAIL) 6/17/20  Yes Provider, Historical   amitriptyline (ELAVIL) 25 mg tablet 2 p.o. nightly  Indications: migraine prevention 7/7/20  Yes Davin Virk DO   PARoxetine (PAXIL) 20 mg tablet Take 1 Tab by mouth daily. Indications: anxiousness associated with depression 7/7/20  Yes Davin Virk DO   ondansetron (ZOFRAN ODT) 4 mg disintegrating tablet Take 1 Tab by mouth every eight (8) hours as needed for Nausea or Vomiting (associated with migraines). 7/7/20  Yes Davin iVrk DO   meclizine (ANTIVERT) 25 mg tablet Take  by mouth three (3) times daily as needed. Yes Provider, Historical   diazepam (VALIUM) 5 mg tablet Take 1 Tab by mouth every eight (8) hours as needed for Anxiety. Max Daily Amount: 15 mg. 10/20/16  Yes Guadalupe Langford MD   ferrous sulfate 325 mg (65 mg iron) tablet Take 1 Tab by mouth two (2) times daily (with meals). 3/12/13  Yes Emili Dent MD           General, constitutional: negative  Eyes, vision: negative  Ears, nose, throat: negative  Cardiovascular, heart: negative  Respiratory: negative  Gastrointestinal: negative  Genitourinary: negative  Musculoskeletal: negative  Skin and integumentary: negative  Psychiatric: negative  Endocrine: negative  Neurological: negative, except for HPI  Hematologic/lymphatic: negative  Allergy/immunology: negative      No flowsheet data found. Karolyn Vargas, who was evaluated through a patient-initiated, synchronous (real-time) audio only encounter, and/or her healthcare decision maker, is aware that it is a billable service, with coverage as determined by her insurance carrier. She provided verbal consent to proceed: yes  She has not had a related appointment within my department in the past 7 days or scheduled within the next 24 hours.       Total Time: 14 minutes    Leticia Culver DO FAAN        Patient Active Problem List   Diagnosis Code    Hypertension I10    Lymphedema I89.0    Migraine G43.909    Hiatal hernia K44.9    Iron deficiency anemia, unspecified D50.9    Other vitamin B12 deficiency anemia D51.8    Other pulmonary embolism and infarction I26.99    Morbid obesity (HCC) E66.01    Long term (current) use of anticoagulants Z79.01    Menometrorrhagia N92.1    Dysmenorrhea N94.6    Status post endometrial ablation Z98.890                   Signed By:  Magan Santizo DO FAAN    January 13, 2021

## 2021-01-14 ENCOUNTER — VIRTUAL VISIT (OUTPATIENT)
Dept: NEUROLOGY | Age: 56
End: 2021-01-14
Payer: MEDICAID

## 2021-01-14 DIAGNOSIS — G43.909 MIGRAINE WITHOUT STATUS MIGRAINOSUS, NOT INTRACTABLE, UNSPECIFIED MIGRAINE TYPE: Primary | ICD-10-CM

## 2021-01-14 DIAGNOSIS — R42 DIZZINESS: ICD-10-CM

## 2021-01-14 DIAGNOSIS — F33.9 RECURRENT MAJOR DEPRESSIVE DISORDER, REMISSION STATUS UNSPECIFIED (HCC): ICD-10-CM

## 2021-01-14 PROCEDURE — 99442 PR PHYS/QHP TELEPHONE EVALUATION 11-20 MIN: CPT | Performed by: PSYCHIATRY & NEUROLOGY

## 2021-01-14 RX ORDER — AMITRIPTYLINE HYDROCHLORIDE 25 MG/1
75 TABLET, FILM COATED ORAL
Qty: 270 TAB | Refills: 3 | Status: SHIPPED | OUTPATIENT
Start: 2021-01-14 | End: 2021-07-26

## 2021-01-18 RX ORDER — BUTALBITAL, ACETAMINOPHEN AND CAFFEINE 50; 325; 40 MG/1; MG/1; MG/1
1 TABLET ORAL
Qty: 20 TAB | Refills: 3 | Status: SHIPPED | OUTPATIENT
Start: 2021-01-18 | End: 2021-06-23

## 2021-01-18 RX ORDER — ONDANSETRON 4 MG/1
4 TABLET, ORALLY DISINTEGRATING ORAL
Qty: 10 TAB | Refills: 5 | Status: SHIPPED | OUTPATIENT
Start: 2021-01-18 | End: 2021-12-06

## 2021-06-21 RX ORDER — PAROXETINE HYDROCHLORIDE 20 MG/1
20 TABLET, FILM COATED ORAL DAILY
Qty: 90 TABLET | Refills: 3 | Status: SHIPPED | OUTPATIENT
Start: 2021-06-21 | End: 2022-05-23

## 2021-06-23 RX ORDER — BUTALBITAL, ACETAMINOPHEN AND CAFFEINE 50; 325; 40 MG/1; MG/1; MG/1
1 TABLET ORAL
Qty: 20 TABLET | Refills: 3 | Status: SHIPPED | OUTPATIENT
Start: 2021-06-23 | End: 2021-11-02 | Stop reason: SDUPTHER

## 2021-07-23 NOTE — PROGRESS NOTES
Neurology Note    Patient ID:  Yonatan Rodriguez  [id number]  49 y.o.  1965      Date of Consultation:  July 26, 2021      Subjective: I still have headaches       History of Present Illness:   Yonatan Rodriguez is a 49 y.o. female who returns today to the neurology clinic at [facility].  She was last seen by me on January 14, 2021 as an audio only visit. To see my summary of that telephone encounter. She was last seen in the office on July 7, 2020. She has a longstanding history of migraine headaches and had been seen by multiple neurologists over the years. She has had headaches since childhood. Since her last visit, her amitriptyline was increased to 75 mg at bedtime. She also continued with Paxil 20 mg a day. She was also taking Fioricet for abortive care, no more than 20 in a month. She continues to struggle with stress and depression associated with the death of family members. She is still having daily headaches. They are holocephalic headaches. She has no clear triggers that she knows. At times the headaches do worsen and become more intense and severe. At that time Fioricet and laying down does seem to help. She states there has only been 12 minutes that she has not had headaches within the last 6 months. She continues to have trouble with her sleep. She has not been back to see her primary care doctor and has not had any blood work checked. .    Past Medical History:   Diagnosis Date    GERD (gastroesophageal reflux disease)     Hypertension     Iron deficiency anemia, unspecified     Long term (current) use of anticoagulants     Hx of pulmonary embolism in 5 yrs ago and November 2013.     Lymphedema     Migraine     Migraine     Other and unspecified symptoms and signs involving general sensations and perceptions     migraine    Other ill-defined conditions(799.89)     anemia    Other vitamin B12 deficiency anemia     Thromboembolus ([facility] [address]     deena PE x2 Past Surgical History:   Procedure Laterality Date    HX GYN      c sections x 3    HX GYN  3/17/14    HYSTEROSCOPY WITH THERMOCHOICE ENDOMETRIAL ABLATION    HX TUBAL LIGATION      with last         Family History   Problem Relation Age of Onset    Heart Disease Mother     Cancer Father         stomach    Cancer Sister 39        colon cancer with mets to the liver. Social History     Tobacco Use    Smoking status: Former Smoker     Quit date: 1997     Years since quittin.0    Smokeless tobacco: Never Used   Substance Use Topics    Alcohol use: Yes     Comment: occasionally        Allergies   Allergen Reactions    Latex Hives    Oxycodone Nausea and Vomiting    Aspirin Rash     Reports this is no longer an allergy (10/20/2016)        Prior to Admission medications    Medication Sig Start Date End Date Taking? Authorizing Provider   amitriptyline (ELAVIL) 25 mg tablet Take 4 Tablets by mouth nightly. Indications: migraine prevention 21  Yes Davin Virk DO   butalbital-acetaminophen-caffeine (FIORICET, ESGIC) -40 mg per tablet TAKE 1 TAB BY MOUTH DAILY AS NEEDED FOR HEADACHE OR MIGRAINE. THE 20 PILL PRESCRIPTION SHOULD LAST 30 DAYS. NO EARLY REFILLS OF THE MEDICATION. 21  Yes Davin Virk DO   PARoxetine (PAXIL) 20 mg tablet TAKE 1 TAB BY MOUTH DAILY. INDICATIONS: ANXIOUSNESS ASSOCIATED WITH DEPRESSION 21  Yes Davin Virk DO   ondansetron (ZOFRAN ODT) 4 mg disintegrating tablet Take 1 Tab by mouth every eight (8) hours as needed for Nausea or Vomiting (associated with migraines). 21  Yes Davin Virk DO   MELOXICAM PO Take  by mouth.    Yes Provider, Historical   omeprazole (PRILOSEC) 40 mg capsule TAKE 1 CAPSULE BY MOUTH EVERY DAY 20  Yes Provider, Historical   lisinopriL (PRINIVIL, ZESTRIL) 20 mg tablet TAKE 2 TABLETS EVERY DAY (10MG UNVAIL) 20  Yes Provider, Historical   meclizine (ANTIVERT) 25 mg tablet Take  by mouth three (3) times daily as needed. Yes Provider, Historical   diazepam (VALIUM) 5 mg tablet Take 1 Tab by mouth every eight (8) hours as needed for Anxiety. Max Daily Amount: 15 mg. 10/20/16  Yes Chirag Abbott MD   ferrous sulfate 325 mg (65 mg iron) tablet Take 1 Tab by mouth two (2) times daily (with meals). 3/12/13  Yes Aldo Augustine MD     Review of Systems:    General, constitutional: Fatigue tiredness  Eyes, vision: Vision is blurry  Ears, nose, throat: Negative  Cardiovascular, heart: negative  Respiratory: Snoring at night  Gastrointestinal: negative  Genitourinary: negative  Musculoskeletal: Joint pain  Skin and integumentary: negative  Psychiatric: Anxiety and depression  Endocrine: negative  Neurological: negative, except for HPI  Hematologic/lymphatic: negative  Allergy/immunology: negative    Objective:     Visit Vitals  /88   Pulse 83   Resp 16   Ht 5' 8\" (1.727 m)   SpO2 97%   BMI 52.15 kg/m²       Physical Exam:    General:  appears well nourished in no acute distress. Obese. She does look fatigued and exhausted  Neck: no carotid bruits  Lungs: clear to auscultation  Heart:  no murmurs, regular rate  Lower extremity: peripheral pulses palpable and bilateral edema  Skin: intact    Neurological exam:    Awake, alert, oriented to person, place and time  Recent and remote memory were normal  Attention and concentration were intact  Language was intact. There was no aphasia  Speech: no dysarthria  Fund of knowledge was preserved    Cranial nerves:   II-XII were tested    Perrrla  Visual fields were full  Eomi, no evidence of nystagmus  Facial sensation:  normal and symmetric  Facial motor: normal and symmetric  Hearing intact  SCM strength intact  Tongue: midline without fasciculations    Motor: Tone normal  Pronator drift was absent  No evidence of fasciculations    Strength testing:   deltoid triceps biceps Wrist ext. Wrist flex. intrinsics Hip flex. Hip ext. Knee ext.   Knee flex Dorsi flex Plantar flex   Right 5 5 5 5 5 5 5 5 5 5 5 5   Left 5 5 5 5 5 5 5 5 5 5 5 5         Sensory:  Upper extremity: intact to pp, light touch, and vibration  Lower extremity: intact to pp, light touch, and vibration     Reflexes:    Right Left  Biceps  2 2  Triceps 2 2  Brachiorad. 2 2  Patella  2 2  Achilles 1 1    Plantar response:  flexor bilaterally      Cerebellar testing:  no tremor apparent, finger/nose and dianne were intact    Romberg: absent    Gait: steady. Labs:     Lab Results   Component Value Date/Time    Hemoglobin A1c <3.5 (L) 11/30/2013 04:00 AM    Sodium 142 02/23/2017 08:27 PM    Potassium 3.6 02/23/2017 08:27 PM    Chloride 108 02/23/2017 08:27 PM    Glucose 98 02/23/2017 08:27 PM    BUN 10 02/23/2017 08:27 PM    Creatinine 1.02 02/23/2017 08:27 PM    Calcium 10.2 (H) 02/23/2017 08:27 PM    WBC 4.9 02/23/2017 08:27 PM    HCT 33.3 (L) 02/23/2017 08:27 PM    HGB 10.1 (L) 02/23/2017 08:27 PM    PLATELET 782 91/90/5822 08:27 PM       Imaging:    No results found for this or any previous visit. Results from [place] encounter on 02/23/17    CT HEAD WO CONT    Narrative  EXAM:  CT HEAD WITHOUT CONTRAST    INDICATION: Dizziness, nausea and numbness in the left hand. COMPARISON: 9/2/2012. CONTRAST: None. TECHNIQUE: Unenhanced CT of the head was performed using 5 mm images. Brain and  bone windows were generated. Sagittal and coronal reformations were generated. CT dose reduction was achieved through use of a standardized protocol tailored  for this examination and automatic exposure control for dose modulation. CT dose  reduction was achieved through use of a standardized protocol tailored for this  examination and automatic exposure control for dose modulation. FINDINGS:  The ventricles and sulci are normal in size, shape and configuration and  midline. There is no significant white matter disease. There is no  intracranial hemorrhage.   There is no extra-axial collection, mass, mass effect  or midline shift. The basilar cisterns are open. No acute infarct is  identified. The bone windows demonstrate no abnormalities. The visualized  portions of the paranasal sinuses and mastoid air cells are clear. Impression  IMPRESSION: Normal unenhanced CT examination of the head. Assessment and Plan:   Patient is a pleasant 61-year-old with multiple medical problems as noted below which impact her overall neurological health who returns to clinic today with migraines. 1. Chronic daily Migraines with superimposed worsening of her headaches. There is also a component of vertiginous migraines:     I will increase her amitriptyline to 100 mg at bedtime. Side effects were reviewed. She will continue Fioricet as 20 pills/month. She also does take Paxil 20 mg a day which helps with depression but may also help with her migraine headaches. I discussed with her starting additional medication. I discussed this with her last time as well. I do think given her situation, Topamax would most likely be the ideal next medication. She has hesitant about the risk of blood clots associated with Topamax. I did reassure her that the risk is quite low with this medication. Given the medication she is currently taking, I will obtain a CBC and a comprehensive metabolic panel looking for signs of toxicity. If in 2 months, her headaches have not improved, then she will be started on Topamax with titration to 50 mg twice a day.       Risk factors for migraines were reviewed with the patient. These include lifestyle modifications, improving exercise, receiving adequate sleep, and monitoring for food triggers. A patient log of migraine frequency, intensity, and possible triggers should be recorded.      Stress and anxiety and depression:  I do think her stress and anxiety are contributing significantly to her migraines.    She will continue with her Paxil  She should follow-up closely with her primary care doctor.     Possible sleep apnea:  He still needs to follow-up with this and her primary care doctor     Episodes of dizziness and vertigo:  Meclizine as needed      Patient Active Problem List   Diagnosis Code    Hypertension I10    Lymphedema I89.0    Migraine G43.909    Hiatal hernia K44.9    Iron deficiency anemia, unspecified D50.9    Other vitamin B12 deficiency anemia D51.8    Other pulmonary embolism and infarction I26.99    Morbid obesity (HCC) E66.01    Long term (current) use of anticoagulants Z79.01    Menometrorrhagia N92.1    Dysmenorrhea N94.6    Status post endometrial ablation Z98.890             The patient should return to clinic in 9 months    Renewed medication:yes    I spent  30  minutes on the day of the encounter preparing the office visit by reviewing medical records, obtaining a history, performing examination, counseling and educating the patient on diagnosis, ordering medications, documenting in the clinical medical record, and coordinating the care for the patient. The patient had the ability to ask questions and all questions were answered.           Signed By:  Adiel Ravi DO FAAN    July 26, 2021

## 2021-07-26 ENCOUNTER — OFFICE VISIT (OUTPATIENT)
Dept: NEUROLOGY | Age: 56
End: 2021-07-26
Payer: MEDICAID

## 2021-07-26 VITALS
HEIGHT: 68 IN | DIASTOLIC BLOOD PRESSURE: 88 MMHG | HEART RATE: 83 BPM | SYSTOLIC BLOOD PRESSURE: 138 MMHG | OXYGEN SATURATION: 97 % | RESPIRATION RATE: 16 BRPM | BODY MASS INDEX: 52.15 KG/M2

## 2021-07-26 DIAGNOSIS — Z51.81 THERAPEUTIC DRUG MONITORING: ICD-10-CM

## 2021-07-26 DIAGNOSIS — G43.909 MIGRAINE WITHOUT STATUS MIGRAINOSUS, NOT INTRACTABLE, UNSPECIFIED MIGRAINE TYPE: Primary | ICD-10-CM

## 2021-07-26 PROCEDURE — 99214 OFFICE O/P EST MOD 30 MIN: CPT | Performed by: PSYCHIATRY & NEUROLOGY

## 2021-07-26 RX ORDER — AMITRIPTYLINE HYDROCHLORIDE 25 MG/1
100 TABLET, FILM COATED ORAL
Qty: 360 TABLET | Refills: 3 | Status: SHIPPED | OUTPATIENT
Start: 2021-07-26 | End: 2021-12-31 | Stop reason: SDUPTHER

## 2021-07-26 NOTE — PROGRESS NOTES
Brandin Wade is a 49 y.o. female  Chief Complaint   Patient presents with    Follow-up     6 month     Health Maintenance Due   Topic Date Due    Hepatitis C Screening  Never done    COVID-19 Vaccine (1) Never done    PAP AKA CERVICAL CYTOLOGY  Never done    Colorectal Cancer Screening Combo  Never done    DTaP/Tdap/Td series (1 - Tdap) 02/26/2012    Shingrix Vaccine Age 50> (1 of 2) Never done    Breast Cancer Screen Mammogram  Never done    Lipid Screen  11/30/2018     Visit Vitals  /88   Pulse 83   Resp 16   Ht 5' 8\" (1.727 m)   SpO2 97%   BMI 52.15 kg/m²

## 2021-07-27 LAB
ALBUMIN SERPL-MCNC: 4.3 G/DL (ref 3.8–4.9)
ALBUMIN/GLOB SERPL: 1.3 {RATIO} (ref 1.2–2.2)
ALP SERPL-CCNC: 76 IU/L (ref 48–121)
ALT SERPL-CCNC: 25 IU/L (ref 0–32)
AST SERPL-CCNC: 27 IU/L (ref 0–40)
BASOPHILS # BLD AUTO: 0 X10E3/UL (ref 0–0.2)
BASOPHILS NFR BLD AUTO: 1 %
BILIRUB SERPL-MCNC: 0.4 MG/DL (ref 0–1.2)
BUN SERPL-MCNC: 11 MG/DL (ref 6–24)
BUN/CREAT SERPL: 12 (ref 9–23)
CALCIUM SERPL-MCNC: 10.6 MG/DL (ref 8.7–10.2)
CHLORIDE SERPL-SCNC: 102 MMOL/L (ref 96–106)
CO2 SERPL-SCNC: 23 MMOL/L (ref 20–29)
CREAT SERPL-MCNC: 0.94 MG/DL (ref 0.57–1)
EOSINOPHIL # BLD AUTO: 0.2 X10E3/UL (ref 0–0.4)
EOSINOPHIL NFR BLD AUTO: 7 %
ERYTHROCYTE [DISTWIDTH] IN BLOOD BY AUTOMATED COUNT: 13 % (ref 11.7–15.4)
GLOBULIN SER CALC-MCNC: 3.2 G/DL (ref 1.5–4.5)
GLUCOSE SERPL-MCNC: 93 MG/DL (ref 65–99)
HCT VFR BLD AUTO: 40.9 % (ref 34–46.6)
HGB BLD-MCNC: 14.1 G/DL (ref 11.1–15.9)
IMM GRANULOCYTES # BLD AUTO: 0 X10E3/UL (ref 0–0.1)
IMM GRANULOCYTES NFR BLD AUTO: 0 %
LYMPHOCYTES # BLD AUTO: 1.4 X10E3/UL (ref 0.7–3.1)
LYMPHOCYTES NFR BLD AUTO: 43 %
MCH RBC QN AUTO: 33 PG (ref 26.6–33)
MCHC RBC AUTO-ENTMCNC: 34.5 G/DL (ref 31.5–35.7)
MCV RBC AUTO: 96 FL (ref 79–97)
MONOCYTES # BLD AUTO: 0.2 X10E3/UL (ref 0.1–0.9)
MONOCYTES NFR BLD AUTO: 8 %
NEUTROPHILS # BLD AUTO: 1.3 X10E3/UL (ref 1.4–7)
NEUTROPHILS NFR BLD AUTO: 41 %
PLATELET # BLD AUTO: 203 X10E3/UL (ref 150–450)
POTASSIUM SERPL-SCNC: 4.2 MMOL/L (ref 3.5–5.2)
PROT SERPL-MCNC: 7.5 G/DL (ref 6–8.5)
RBC # BLD AUTO: 4.27 X10E6/UL (ref 3.77–5.28)
SODIUM SERPL-SCNC: 140 MMOL/L (ref 134–144)
WBC # BLD AUTO: 3.2 X10E3/UL (ref 3.4–10.8)

## 2021-07-27 NOTE — PROGRESS NOTES
Hi,Your hemoglobin was normal but your white blood cell count was slightly reduced. Please make sure to follow-up with your primary care doctor about this. Thanks,Dr. Vi Mason

## 2021-11-02 RX ORDER — BUTALBITAL, ACETAMINOPHEN AND CAFFEINE 50; 325; 40 MG/1; MG/1; MG/1
1 TABLET ORAL
Qty: 20 TABLET | Refills: 3 | Status: SHIPPED | OUTPATIENT
Start: 2021-11-02 | End: 2022-03-01

## 2021-12-06 RX ORDER — ONDANSETRON 4 MG/1
TABLET, ORALLY DISINTEGRATING ORAL
Qty: 10 TABLET | Refills: 5 | Status: SHIPPED | OUTPATIENT
Start: 2021-12-06

## 2021-12-31 RX ORDER — AMITRIPTYLINE HYDROCHLORIDE 25 MG/1
100 TABLET, FILM COATED ORAL
Qty: 360 TABLET | Refills: 3 | Status: SHIPPED | OUTPATIENT
Start: 2021-12-31 | End: 2022-03-01 | Stop reason: SDUPTHER

## 2022-03-01 RX ORDER — AMITRIPTYLINE HYDROCHLORIDE 25 MG/1
100 TABLET, FILM COATED ORAL
Qty: 360 TABLET | Refills: 3 | Status: SHIPPED | OUTPATIENT
Start: 2022-03-01 | End: 2022-05-23

## 2022-03-01 RX ORDER — BUTALBITAL, ACETAMINOPHEN AND CAFFEINE 50; 325; 40 MG/1; MG/1; MG/1
TABLET ORAL
Qty: 20 TABLET | Refills: 3 | Status: SHIPPED | OUTPATIENT
Start: 2022-03-01 | End: 2022-07-25

## 2022-05-23 RX ORDER — AMITRIPTYLINE HYDROCHLORIDE 25 MG/1
TABLET, FILM COATED ORAL
Qty: 270 TABLET | Refills: 3 | Status: SHIPPED | OUTPATIENT
Start: 2022-05-23 | End: 2022-10-18 | Stop reason: SDUPTHER

## 2022-05-23 RX ORDER — PAROXETINE HYDROCHLORIDE 20 MG/1
20 TABLET, FILM COATED ORAL DAILY
Qty: 90 TABLET | Refills: 3 | Status: SHIPPED | OUTPATIENT
Start: 2022-05-23

## 2022-07-25 RX ORDER — BUTALBITAL, ACETAMINOPHEN AND CAFFEINE 50; 325; 40 MG/1; MG/1; MG/1
TABLET ORAL
Qty: 20 TABLET | Refills: 3 | Status: SHIPPED | OUTPATIENT
Start: 2022-07-25

## 2022-09-26 NOTE — PROGRESS NOTES
Neurology Note    Patient ID:  Dae Cohen  [id number]  26 y.o.  1965      Date of Consultation:  September 27, 2022      Subjective: I still have headaches       History of Present Illness:   Dae Cohen is a 56 y.o. female who returns today to the neurology clinic at [facility].  She was last seen by me on July 26, 2021. Please see my history of present illness, examination, and treatment plan from that day. She does have a longstanding history of migraine headaches since childhood and has been seen by multiple prior neurologist.      At her last visit, her amitriptyline was increased to 100 mg at bedtime. She also continued with Paxil 20 mg a day. She was also taking Fioricet for abortive care, no more than 20 in a month. Reports that the increase in dose of amitriptyline has not helped with her headache. She continues to have daily headaches. There is a waxing and waning quality to it and some days are worse than others but there is still daily headaches. .  They are holocephalic in nature. There is photophobia and sonophobia. She has continued to be under significant amount of stress. She is caring for multiple family members who have disabilities. She also has multiple other stressors going on in her life. She does continue to use for her subsequent breakthrough headaches but she does not see much in the way of a benefit with these medications. She does feel that her depression is quite severe and has not reached out to anybody to talk to her. She was hospitalized for a hiatal hernia at [facility].  There was discussion about surgery but ultimately was not performed. She also did see her primary care doctor 1 month ago due to a spider bite she was given antibiotics. As a review, she has had headaches since her young age.   She was on abortive therapy alone for many years and at times was receiving Fioricet prescriptions up to 90 pills/month. Prophylactic medications have included Topamax, amitriptyline and Paxil. For abortive care she has tried Fioricet and Imitrex in the past per report. Topamax, she believes she has taken this before but is very hesitant on restarting due to a risk of blood clots    Past Medical History:   Diagnosis Date    GERD (gastroesophageal reflux disease)     Hypertension     Iron deficiency anemia, unspecified     Long term (current) use of anticoagulants     Hx of pulmonary embolism in 5 yrs ago and 2013. Lymphedema     Migraine     Migraine     Other and unspecified symptoms and signs involving general sensations and perceptions     migraine    Other ill-defined conditions(799.89)     anemia    Other vitamin B12 deficiency anemia     Thromboembolus (HCC)     deena PE x2        Past Surgical History:   Procedure Laterality Date    HX GYN      c sections x 3    HX GYN  3/17/14    HYSTEROSCOPY WITH THERMOCHOICE ENDOMETRIAL ABLATION    HX TUBAL LIGATION      with last         Family History   Problem Relation Age of Onset    Heart Disease Mother     Headache Mother     Cancer Mother     Cancer Father         stomach    Cancer Sister 39        colon cancer with mets to the liver. Social History     Tobacco Use    Smoking status: Former     Types: Cigarettes     Quit date: 1997     Years since quittin.2    Smokeless tobacco: Never   Substance Use Topics    Alcohol use: Not Currently     Comment: occasionally        Allergies   Allergen Reactions    Latex Hives    Oxycodone Nausea and Vomiting    Aspirin Rash     Reports this is no longer an allergy (10/20/2016)        Prior to Admission medications    Medication Sig Start Date End Date Taking? Authorizing Provider   losartan (COZAAR) 50 mg tablet Take 50 mg by mouth two (2) times a day.  22  Yes Provider, Historical   cyclobenzaprine (FLEXERIL) 10 mg tablet TAKE 1 TABLET BY MOUTH AT BEDTIME , MUSCLE RELAXER 22  Yes Provider, Historical   ergocalciferol (ERGOCALCIFEROL) 1,250 mcg (50,000 unit) capsule TAKE 1 CAPSULE BY MOUTH ONCE A WEEK 9/20/22  Yes Provider, Historical   fremanezumab-vfrm (Ajovy Syringe) 225 mg/1.5 mL syrg injection 1.5 mL by SubCUTAneous route every month. 9/27/22  Yes Davin Virk, DO   ubrogepant [place] Zavala) 100 mg tablet Take 1 Tablet by mouth daily as needed for Migraine. No more than 9 pills in one month 9/27/22  Yes Davin Virk DO   butalbital-acetaminophen-caffeine (FIORICET, ESGIC) -40 mg per tablet TAKE 1 TABLET BY MOUTH DAILY AS NEEDED FOR HEADACHE/MIGRAINE-RX SHOULD LAST 30 DAYS-NO EARLY REFILLS 7/25/22  Yes Davin Virk DO   PARoxetine (PAXIL) 20 mg tablet TAKE 1 TAB BY MOUTH DAILY. INDICATIONS: ANXIOUSNESS ASSOCIATED WITH DEPRESSION 5/23/22  Yes Davin Virk DO   amitriptyline (ELAVIL) 25 mg tablet TAKE 3 TABLETS NIGHTLY AS DIRECTED 5/23/22  Yes Davin Virk DO   ondansetron (ZOFRAN ODT) 4 mg disintegrating tablet TAKE 1 TAB BY MOUTH EVERY 8 HOURS AS NEEDED FOR NAUSEA OR VOMITING (ASSOCIATED WITH MIGRAINES). 12/6/21  Yes Davin Virk DO   MELOXICAM PO Take  by mouth. Yes Provider, Historical   omeprazole (PRILOSEC) 40 mg capsule TAKE 1 CAPSULE BY MOUTH EVERY DAY 5/18/20  Yes Provider, Historical   ferrous sulfate 325 mg (65 mg iron) tablet Take 1 Tab by mouth two (2) times daily (with meals). 3/12/13  Yes Yeni Ferrell MD   lisinopriL (PRINIVIL, ZESTRIL) 20 mg tablet TAKE 2 TABLETS EVERY DAY (10MG UNVAIL)  Patient not taking: Reported on 9/27/2022 6/17/20   Provider, Historical   meclizine (ANTIVERT) 25 mg tablet Take  by mouth three (3) times daily as needed. Patient not taking: Reported on 9/27/2022    Provider, Historical   diazepam (VALIUM) 5 mg tablet Take 1 Tab by mouth every eight (8) hours as needed for Anxiety. Max Daily Amount: 15 mg.   Patient not taking: Reported on 9/27/2022 10/20/16   Chere Bamberger, MD     Review of Systems:    General, constitutional: Fatigue tiredness  Eyes, vision: Vision is blurry  Ears, nose, throat: Negative  Cardiovascular, heart: negative  Respiratory: Snoring at night  Gastrointestinal: negative  Genitourinary: negative  Musculoskeletal: Joint pain  Skin and integumentary: negative  Psychiatric: Anxiety and depression  Endocrine: negative  Neurological: negative, except for HPI  Hematologic/lymphatic: negative  Allergy/immunology: negative    Objective:     Visit Vitals  BP (!) 140/88 (BP 1 Location: Left arm, BP Patient Position: Sitting, BP Cuff Size: Adult)   Pulse 83   Resp 16   Ht 5' 8\" (1.727 m)   Wt 344 lb (156 kg)   SpO2 97%   BMI 52.31 kg/m²         Physical Exam:    General:  appears well nourished in no acute distress. Obese. She does look fatigued and exhausted. This is similar to last visit. Neck: no carotid bruits  Lungs: clear to auscultation  Heart:  no murmurs, regular rate  Lower extremity: peripheral pulses palpable and bilateral edema  Skin: intact    Neurological exam:    Awake, alert, oriented to person, place and time  Recent and remote memory were normal  Attention and concentration were intact  Language was intact. There was no aphasia  Speech: no dysarthria  Fund of knowledge was preserved    Cranial nerves:   II-XII were tested    Perrrla  Visual fields were full  Eomi, no evidence of nystagmus  Facial sensation:  normal and symmetric  Facial motor: normal and symmetric  Hearing intact  SCM strength intact  Tongue: midline without fasciculations    Motor: Tone normal  Pronator drift was absent  No evidence of fasciculations    Strength testing:   deltoid triceps biceps Wrist ext. Wrist flex. intrinsics Hip flex. Hip ext. Knee ext.   Knee flex Dorsi flex Plantar flex   Right 5 5 5 5 5 5 5 5 5 5 5 5   Left 5 5 5 5 5 5 5 5 5 5 5 5         Sensory:  Upper extremity: intact to pp, light touch, and vibration  Lower extremity: intact to pp, light touch, and vibration Reflexes:    Right Left  Biceps  2 2  Triceps 2 2  Brachiorad. 2 2  Patella  2 2  Achilles 1 1    Plantar response:  flexor bilaterally      Cerebellar testing:  no tremor apparent, finger/nose and dianne were intact    Romberg: absent    Gait: steady. Labs:     Lab Results   Component Value Date/Time    Hemoglobin A1c <3.5 (L) 11/30/2013 04:00 AM    Sodium 140 07/26/2021 12:00 AM    Potassium 4.2 07/26/2021 12:00 AM    Chloride 102 07/26/2021 12:00 AM    Glucose 93 07/26/2021 12:00 AM    BUN 11 07/26/2021 12:00 AM    Creatinine 0.94 07/26/2021 12:00 AM    Calcium 10.6 (H) 07/26/2021 12:00 AM    WBC 3.2 (L) 07/26/2021 12:00 AM    HCT 40.9 07/26/2021 12:00 AM    HGB 14.1 07/26/2021 12:00 AM    PLATELET 144 64/52/8972 12:00 AM       Imaging:    No results found for this or any previous visit. Results from [place] encounter on 02/23/17    CT HEAD WO CONT    Narrative  EXAM:  CT HEAD WITHOUT CONTRAST    INDICATION: Dizziness, nausea and numbness in the left hand. COMPARISON: 9/2/2012. CONTRAST: None. TECHNIQUE: Unenhanced CT of the head was performed using 5 mm images. Brain and  bone windows were generated. Sagittal and coronal reformations were generated. CT dose reduction was achieved through use of a standardized protocol tailored  for this examination and automatic exposure control for dose modulation. CT dose  reduction was achieved through use of a standardized protocol tailored for this  examination and automatic exposure control for dose modulation. FINDINGS:  The ventricles and sulci are normal in size, shape and configuration and  midline. There is no significant white matter disease. There is no  intracranial hemorrhage. There is no extra-axial collection, mass, mass effect  or midline shift. The basilar cisterns are open. No acute infarct is  identified. The bone windows demonstrate no abnormalities.   The visualized  portions of the paranasal sinuses and mastoid air cells are clear. Impression  IMPRESSION: Normal unenhanced CT examination of the head. Assessment and Plan:   Patient is a pleasant 53-year-old with multiple medical problems as noted below which impact her overall neurological health who returns to clinic today with migraines. 1. Chronic daily Migraines with superimposed worsening of her headaches. There is also a component of vertiginous migraines:     continue amitriptyline to 100 mg at bedtime. Side effects were reviewed. I will start ajovy monthly. Side effects discussed with patient. Will prescribe ubrevly for abortive care. Fioricet was ineffective. She also does take Paxil 20 mg a day which helps with depression but may also help with her migraine headaches. Risk factors for migraines were reviewed with the patient. These include lifestyle modifications, improving exercise, receiving adequate sleep, and monitoring for food triggers. A patient log of migraine frequency, intensity, and possible triggers should be recorded. Stress and anxiety and depression:  I do think her stress and anxiety are contributing significantly to her migraines. She will continue with her Paxil  She should follow-up closely with her primary care doctor. I did give her her list of therapy options today.      Possible sleep apnea:  He still needs to follow-up with this and her primary care doctor     Episodes of dizziness and vertigo:  Not as symptomatic as previously      Patient Active Problem List   Diagnosis Code    Hypertension I10    Lymphedema I89.0    Migraine G43.909    Hiatal hernia K44.9    Iron deficiency anemia, unspecified D50.9    Other vitamin B12 deficiency anemia D51.8    Other pulmonary embolism and infarction I26.99    Morbid obesity ([address] E66.01    Long term (current) use of anticoagulants Z79.01    Menometrorrhagia N92.1    Dysmenorrhea N94.6    Status post endometrial ablation Z98.890             The patient should return to clinic in 6 months    Renewed medication:yes    I spent  32  minutes on the day of the encounter preparing the office visit by reviewing medical records, obtaining a history, performing examination, counseling and educating the patient on diagnosis, ordering medications, documenting in the clinical medical record, and coordinating the care for the patient. The patient had the ability to ask questions and all questions were answered.           Signed By:  Shasta Landin DO FAAN    September 27, 2022

## 2022-09-27 ENCOUNTER — OFFICE VISIT (OUTPATIENT)
Dept: NEUROLOGY | Age: 57
End: 2022-09-27
Payer: MEDICAID

## 2022-09-27 VITALS
HEART RATE: 83 BPM | OXYGEN SATURATION: 97 % | BODY MASS INDEX: 44.41 KG/M2 | WEIGHT: 293 LBS | DIASTOLIC BLOOD PRESSURE: 88 MMHG | SYSTOLIC BLOOD PRESSURE: 140 MMHG | HEIGHT: 68 IN | RESPIRATION RATE: 16 BRPM

## 2022-09-27 DIAGNOSIS — F33.9 RECURRENT MAJOR DEPRESSIVE DISORDER, REMISSION STATUS UNSPECIFIED (HCC): ICD-10-CM

## 2022-09-27 DIAGNOSIS — R42 DIZZINESS: ICD-10-CM

## 2022-09-27 DIAGNOSIS — G43.909 MIGRAINE WITHOUT STATUS MIGRAINOSUS, NOT INTRACTABLE, UNSPECIFIED MIGRAINE TYPE: Primary | ICD-10-CM

## 2022-09-27 PROCEDURE — 99214 OFFICE O/P EST MOD 30 MIN: CPT | Performed by: PSYCHIATRY & NEUROLOGY

## 2022-09-27 RX ORDER — ERGOCALCIFEROL 1.25 MG/1
CAPSULE ORAL
COMMUNITY
Start: 2022-09-20

## 2022-09-27 RX ORDER — FREMANEZUMAB-VFRM 225 MG/1.5ML
225 INJECTION SUBCUTANEOUS
Qty: 3 EACH | Refills: 3 | Status: SHIPPED | OUTPATIENT
Start: 2022-09-27 | End: 2022-09-29

## 2022-09-27 RX ORDER — CYCLOBENZAPRINE HCL 10 MG
TABLET ORAL
COMMUNITY
Start: 2022-09-20

## 2022-09-27 RX ORDER — LOSARTAN POTASSIUM 50 MG/1
50 TABLET ORAL 2 TIMES DAILY
COMMUNITY
Start: 2022-09-20

## 2022-09-28 ENCOUNTER — TELEPHONE (OUTPATIENT)
Dept: NEUROLOGY | Age: 57
End: 2022-09-28

## 2022-09-29 NOTE — TELEPHONE ENCOUNTER
Attempted to contact patient. Unable to reach. Left detailed message for patient on voicemail. Patient first/last name left on voicemail recording.

## 2022-10-11 ENCOUNTER — PATIENT MESSAGE (OUTPATIENT)
Dept: NEUROLOGY | Age: 57
End: 2022-10-11

## 2022-10-18 RX ORDER — AMITRIPTYLINE HYDROCHLORIDE 25 MG/1
100 TABLET, FILM COATED ORAL
Qty: 360 TABLET | Refills: 3 | Status: SHIPPED | OUTPATIENT
Start: 2022-10-18

## 2022-10-28 NOTE — TELEPHONE ENCOUNTER
Called and spoke with Chrinne @ [facility]. Notified her what they patient stated in her [facility] message. They did sent this fax over 10/22/2022. On 10/24/2022 [facility] spoke to Kory that stated they [facility] [address] [facility]      Pharmacy:   [phone]

## 2022-10-28 NOTE — TELEPHONE ENCOUNTER
From: Veronique Rashid  To: Tobi Card DO  Sent: 10/11/2022 6:03 PM EDT  Subject: Migraine medications    When will they be sent to pharmacy. Leatha Gip also what all am I going to be taking . .daughter told me a message was left from nurse but I was in the hospital ..ty.Theresa [address]

## 2022-11-14 ENCOUNTER — TELEPHONE (OUTPATIENT)
Dept: NEUROLOGY | Age: 57
End: 2022-11-14

## 2022-11-15 NOTE — TELEPHONE ENCOUNTER
RE:Nurtec sent to [facility] through [facility] status pending Key: [id number])

## 2022-11-16 NOTE — TELEPHONE ENCOUNTER
RE:Nurtec prior authorization denied no denial letter at this time will forward to the provider for review

## 2022-11-30 NOTE — TELEPHONE ENCOUNTER
befr  Received: 1 week ago  Jing Chris sent to Osiel Franco, [address], LPN  Caller: Unspecified (2 weeks ago)  The denial doesn't mean Nurtec isn't preferred. Insurance requires patients try and fail 1 or more triptans before trying  nurtec. That's probably what the denial is for however I cant be for sure because I don't have a denial letter as of this moment.

## 2022-11-30 NOTE — TELEPHONE ENCOUNTER
[address] [facility] [address] and spoke to [facility] and she advised that they are out of network for pt and they sent to her local [facility] pharmacy at [phone]. Called [facility] and pharmacist advised still needing a PA on medication. No advising to try anything before Nurtec. She resent info to us again.

## 2022-12-13 ENCOUNTER — TELEPHONE (OUTPATIENT)
Dept: NEUROLOGY | Age: 57
End: 2022-12-13

## 2022-12-13 NOTE — TELEPHONE ENCOUNTER
Nurtec denied because it was marked patient has not tried any triptans. But because patient has hypertension, she would be contraindicated for triptan use. Shar Luong - please submit a reconsideration for Nurtec. Note: There is an electronic appeal form that I was going to submit, and then on the last page, it required for US to put the fax number. I told [facility] what good is the electronic form if they do not provide the number, when the purpose is to streamline the process! Giselle Sparrow (Zhu: [id number]) - [phone]  - that's the \"E-Appeal\" form online.

## 2022-12-17 ENCOUNTER — TELEPHONE (OUTPATIENT)
Dept: NEUROLOGY | Age: 57
End: 2022-12-17

## 2022-12-20 NOTE — TELEPHONE ENCOUNTER
Alyson King         Telephone Encounter      Signed   Encounter Date:  12/17/2022                                     RE:Nurtec approved through [facility]  (Key: [id number])  PA Case: [id number], Status: Approved, Coverage Starts on: 12/16/2022 12:00:00 AM, Coverage Ends on: 12/16/2023 12:00:00 AM. Will fax approval to the pharmacy       Electronically signed by Alyson King at 12/17/22 1241              Sent pt a my chart message regarding this . Will close out this encounter as pt was notified.

## 2023-07-17 RX ORDER — RIMEGEPANT SULFATE 75 MG/75MG
TABLET, ORALLY DISINTEGRATING ORAL
Qty: 16 TABLET | Refills: 0 | Status: SHIPPED | OUTPATIENT
Start: 2023-07-17

## 2023-08-23 RX ORDER — PAROXETINE HYDROCHLORIDE 20 MG/1
TABLET, FILM COATED ORAL
Qty: 90 TABLET | Refills: 0 | Status: SHIPPED | OUTPATIENT
Start: 2023-08-23

## 2023-09-15 RX ORDER — RIMEGEPANT SULFATE 75 MG/75MG
TABLET, ORALLY DISINTEGRATING ORAL
Qty: 16 TABLET | Refills: 5 | Status: SHIPPED | OUTPATIENT
Start: 2023-09-15

## 2024-02-08 DIAGNOSIS — G43.909 MIGRAINE WITHOUT STATUS MIGRAINOSUS, NOT INTRACTABLE, UNSPECIFIED MIGRAINE TYPE: Primary | ICD-10-CM

## 2024-02-09 RX ORDER — AMITRIPTYLINE HYDROCHLORIDE 25 MG/1
100 TABLET, FILM COATED ORAL NIGHTLY
Qty: 120 TABLET | Refills: 11 | Status: SHIPPED | OUTPATIENT
Start: 2024-02-09

## 2024-02-13 RX ORDER — PAROXETINE HYDROCHLORIDE 20 MG/1
TABLET, FILM COATED ORAL
Qty: 90 TABLET | Refills: 0 | OUTPATIENT
Start: 2024-02-13

## 2024-02-13 NOTE — TELEPHONE ENCOUNTER
Called [facility] pharmacy and spoke to [employer] regarding the Paxil. Advised it was sent on 12/22/23 for a 90 so pt should still have medication. Pt did  a 90 and has not requested this refill. It was generated by the pharmacy. She will stop the refill request until we send in the medication. Pt will be seen on 3/21/24 with NP for further refills. Will deny the request.

## 2024-03-01 RX ORDER — PAROXETINE HYDROCHLORIDE 20 MG/1
TABLET, FILM COATED ORAL
Qty: 90 TABLET | Refills: 0 | OUTPATIENT
Start: 2024-03-01

## 2024-03-01 NOTE — TELEPHONE ENCOUNTER
See encounter 2/8/24 for this. Pharmacy generated this refill. Pt will be seen in office 3/21/24 for further refills or will call office when refill is needed.   Denied as duplicate and will close out encounter.

## 2024-04-03 RX ORDER — PAROXETINE HYDROCHLORIDE 20 MG/1
TABLET, FILM COATED ORAL
Qty: 90 TABLET | Refills: 0 | Status: SHIPPED | OUTPATIENT
Start: 2024-04-03

## 2024-07-02 ENCOUNTER — OFFICE VISIT (OUTPATIENT)
Age: 59
End: 2024-07-02
Payer: MEDICAID

## 2024-07-02 ENCOUNTER — TELEPHONE (OUTPATIENT)
Age: 59
End: 2024-07-02

## 2024-07-02 VITALS
OXYGEN SATURATION: 97 % | WEIGHT: 293 LBS | HEIGHT: 68 IN | BODY MASS INDEX: 44.41 KG/M2 | HEART RATE: 96 BPM | DIASTOLIC BLOOD PRESSURE: 88 MMHG | SYSTOLIC BLOOD PRESSURE: 128 MMHG | RESPIRATION RATE: 18 BRPM

## 2024-07-02 DIAGNOSIS — F41.9 ANXIETY DISORDER, UNSPECIFIED TYPE: ICD-10-CM

## 2024-07-02 DIAGNOSIS — G43.709 CHRONIC MIGRAINE W/O AURA, NOT INTRACTABLE, W/O STAT MIGR: Primary | ICD-10-CM

## 2024-07-02 DIAGNOSIS — G43.809 VESTIBULAR MIGRAINE: ICD-10-CM

## 2024-07-02 PROCEDURE — 3079F DIAST BP 80-89 MM HG: CPT | Performed by: NURSE PRACTITIONER

## 2024-07-02 PROCEDURE — 99214 OFFICE O/P EST MOD 30 MIN: CPT | Performed by: NURSE PRACTITIONER

## 2024-07-02 PROCEDURE — 3074F SYST BP LT 130 MM HG: CPT | Performed by: NURSE PRACTITIONER

## 2024-07-02 RX ORDER — AMITRIPTYLINE HYDROCHLORIDE 100 MG/1
100 TABLET ORAL NIGHTLY
Qty: 30 TABLET | Refills: 5 | Status: SHIPPED | OUTPATIENT
Start: 2024-07-02

## 2024-07-02 RX ORDER — UBROGEPANT 100 MG/1
TABLET ORAL
Qty: 10 TABLET | Refills: 2 | Status: SHIPPED | OUTPATIENT
Start: 2024-07-02

## 2024-07-02 RX ORDER — PAROXETINE 30 MG/1
30 TABLET, FILM COATED ORAL DAILY
Qty: 30 TABLET | Refills: 3 | Status: SHIPPED | OUTPATIENT
Start: 2024-07-02

## 2024-07-02 RX ORDER — ONDANSETRON 4 MG/1
TABLET, ORALLY DISINTEGRATING ORAL
Qty: 20 TABLET | Refills: 3 | Status: SHIPPED | OUTPATIENT
Start: 2024-07-02

## 2024-07-02 ASSESSMENT — PATIENT HEALTH QUESTIONNAIRE - PHQ9
SUM OF ALL RESPONSES TO PHQ QUESTIONS 1-9: 0
1. LITTLE INTEREST OR PLEASURE IN DOING THINGS: NOT AT ALL
SUM OF ALL RESPONSES TO PHQ QUESTIONS 1-9: 0
SUM OF ALL RESPONSES TO PHQ QUESTIONS 1-9: 0
2. FEELING DOWN, DEPRESSED OR HOPELESS: NOT AT ALL
SUM OF ALL RESPONSES TO PHQ QUESTIONS 1-9: 0
SUM OF ALL RESPONSES TO PHQ9 QUESTIONS 1 & 2: 0

## 2024-07-02 ASSESSMENT — ENCOUNTER SYMPTOMS
NAUSEA: 1
PHOTOPHOBIA: 1
VOMITING: 1

## 2024-07-02 NOTE — TELEPHONE ENCOUNTER
RE:Ubrelvy 100 mg tablets prior authorization request sent to [employer] via [employer]    Case ID: [id number]     PA Case: [id number]     Status: Approved    Coverage Starts on: 7/2/2024     Coverage Ends on: 7/2/2025     Details in the referral tab    FYI to nurse

## 2024-07-02 NOTE — PROGRESS NOTES
[facility]  [address]  [address]  [place] [address]  Tel: [phone]  Fax: [phone]      Date:  24     Name:  GISSELLE CRANE  :  1965  MRN:  [id number]     PCP:  No primary care provider on file.    Chief Complaint   Patient presents with    Migraine     Follow up for vestibular migraines   Dizziness is starting to come back a little more at a time - last time dosage of med was increased        HISTORY OF PRESENT ILLNESS:  Patient presents today for regular follow up, last seen 2022 with Dr Fritz.  Patient notes overall she is doing okay, she does feel as though her dizziness is starting to worsen.   Migraine headaches  Location: behind the eyes, it goes back and forth.  Frequency: daily headaches, the intensity varies.  Characteristics: nail like pain behind the eyes.  Aura:not usually.  N/V: yes and yes.  Light and sound sensitive: yes and yes.  Triggers:  sometimes certain foods, she avoids cheese.   Relief:  sits in the corner of her bed, plays a game on her tablet.  Fioricet.  Does some walking for exercise.  No smoking.  No ETOH.  Limited caffeine.  Drinks plenty of water.   Sleep varies, depends on her stress levels. Autistic niece and son lives with her, she is always listening out.     Current Regimen:  Paxil 20mg: takes it daily.  Amitriptline 25mg takes 4 at night.  Fioricet is helpful.  Meclizine: it does help with the dizziness.  Nurtec: didn't help.  Zofran: it does help.  Ubrelvy:  never got it.    Recap from last visit : 1. Chronic daily Migraines with superimposed worsening of her headaches.  There is also a component of vertiginous migraines:     continue amitriptyline to 100 mg at bedtime. Side effects were reviewed.   I will start ajovy monthly.  Side effects discussed with patient.   Will prescribe ubrevly for abortive care.  Fioricet was ineffective.  She also does take Paxil 20 mg a day which helps with depression but may also help

## 2024-07-02 NOTE — PROGRESS NOTES
Chief Complaint   Patient presents with    Migraine     Follow up for vestibular migraines   Dizziness is starting to come back a little more at a time - last time dosage of med was increased      1. Have you been to the ER, urgent care clinic since your last visit?  Hospitalized since your last visit? Yes [facility] ER stomach pain and vomiting     2. Have you seen or consulted any other health care providers outside of the [facility] since your last visit?  Include any pap smears or colon screening.  Yes see above

## 2024-09-01 DIAGNOSIS — G43.709 CHRONIC MIGRAINE W/O AURA, NOT INTRACTABLE, W/O STAT MIGR: ICD-10-CM

## 2024-09-03 RX ORDER — UBROGEPANT 100 MG/1
TABLET ORAL
Qty: 10 TABLET | Refills: 2 | Status: SHIPPED | OUTPATIENT
Start: 2024-09-03

## 2024-11-01 DIAGNOSIS — F41.9 ANXIETY DISORDER, UNSPECIFIED TYPE: ICD-10-CM

## 2024-11-06 RX ORDER — PAROXETINE 30 MG/1
30 TABLET, FILM COATED ORAL DAILY
Qty: 30 TABLET | Refills: 5 | Status: SHIPPED | OUTPATIENT
Start: 2024-11-06

## 2024-12-31 DIAGNOSIS — G43.709 CHRONIC MIGRAINE W/O AURA, NOT INTRACTABLE, W/O STAT MIGR: ICD-10-CM

## 2024-12-31 RX ORDER — AMITRIPTYLINE HYDROCHLORIDE 100 MG/1
100 TABLET ORAL NIGHTLY
Qty: 30 TABLET | Refills: 5 | Status: SHIPPED | OUTPATIENT
Start: 2024-12-31

## 2025-01-30 DIAGNOSIS — G43.709 CHRONIC MIGRAINE W/O AURA, NOT INTRACTABLE, W/O STAT MIGR: ICD-10-CM

## 2025-01-31 RX ORDER — UBROGEPANT 100 MG/1
TABLET ORAL
Qty: 10 TABLET | Refills: 2 | Status: ACTIVE | OUTPATIENT
Start: 2025-01-31

## 2025-02-04 DIAGNOSIS — G43.709 CHRONIC MIGRAINE W/O AURA, NOT INTRACTABLE, W/O STAT MIGR: ICD-10-CM

## 2025-02-04 RX ORDER — ONDANSETRON 4 MG/1
TABLET, ORALLY DISINTEGRATING ORAL
Qty: 20 TABLET | Refills: 3 | Status: SHIPPED | OUTPATIENT
Start: 2025-02-04

## 2025-04-02 DIAGNOSIS — G43.709 CHRONIC MIGRAINE W/O AURA, NOT INTRACTABLE, W/O STAT MIGR: ICD-10-CM

## 2025-04-02 DIAGNOSIS — F41.9 ANXIETY DISORDER, UNSPECIFIED TYPE: ICD-10-CM

## 2025-04-02 RX ORDER — AMITRIPTYLINE HYDROCHLORIDE 100 MG/1
100 TABLET ORAL NIGHTLY
Qty: 90 TABLET | Refills: 2 | Status: SHIPPED | OUTPATIENT
Start: 2025-04-02

## 2025-04-02 RX ORDER — PAROXETINE 30 MG/1
30 TABLET, FILM COATED ORAL DAILY
Qty: 90 TABLET | Refills: 2 | Status: SHIPPED | OUTPATIENT
Start: 2025-04-02

## 2025-04-02 NOTE — TELEPHONE ENCOUNTER
Last OV was 7/2/24. Next is scheduled for 7/3/25  Last filled amitriptyline on 12/31/24 # 30 with 5 refills and paroxetine on 11/6/24 # 30 with one refill

## 2025-06-03 DIAGNOSIS — G43.709 CHRONIC MIGRAINE W/O AURA, NOT INTRACTABLE, W/O STAT MIGR: ICD-10-CM

## 2025-06-04 RX ORDER — UBROGEPANT 100 MG/1
TABLET ORAL
Qty: 10 TABLET | Refills: 2 | Status: ACTIVE | OUTPATIENT
Start: 2025-06-04

## 2025-07-29 DIAGNOSIS — G43.709 CHRONIC MIGRAINE W/O AURA, NOT INTRACTABLE, W/O STAT MIGR: ICD-10-CM

## 2025-07-30 RX ORDER — UBROGEPANT 100 MG/1
TABLET ORAL
Qty: 6 TABLET | Refills: 4 | OUTPATIENT
Start: 2025-07-30